# Patient Record
Sex: FEMALE | Race: BLACK OR AFRICAN AMERICAN | Employment: PART TIME | URBAN - METROPOLITAN AREA
[De-identification: names, ages, dates, MRNs, and addresses within clinical notes are randomized per-mention and may not be internally consistent; named-entity substitution may affect disease eponyms.]

---

## 2017-04-25 ENCOUNTER — OFFICE VISIT (OUTPATIENT)
Dept: ONCOLOGY | Age: 62
End: 2017-04-25

## 2017-04-25 ENCOUNTER — HOSPITAL ENCOUNTER (OUTPATIENT)
Dept: ONCOLOGY | Age: 62
Discharge: HOME | End: 2017-04-25

## 2017-04-25 ENCOUNTER — TELEPHONE (OUTPATIENT)
Dept: ONCOLOGY | Age: 62
End: 2017-04-25

## 2017-04-25 VITALS
BODY MASS INDEX: 26.81 KG/M2 | HEIGHT: 61 IN | SYSTOLIC BLOOD PRESSURE: 144 MMHG | WEIGHT: 142 LBS | DIASTOLIC BLOOD PRESSURE: 91 MMHG | HEART RATE: 87 BPM | TEMPERATURE: 98.1 F

## 2017-04-25 DIAGNOSIS — D64.9 ANEMIA, UNSPECIFIED TYPE: ICD-10-CM

## 2017-04-25 DIAGNOSIS — M15.9 PRIMARY OSTEOARTHRITIS INVOLVING MULTIPLE JOINTS: ICD-10-CM

## 2017-04-25 DIAGNOSIS — C50.911 INVASIVE DUCTAL CARCINOMA OF BREAST, RIGHT (HCC): Primary | ICD-10-CM

## 2017-04-25 DIAGNOSIS — K21.9 GASTROESOPHAGEAL REFLUX DISEASE WITHOUT ESOPHAGITIS: ICD-10-CM

## 2017-04-25 LAB
BASO+EOS+MONOS # BLD AUTO: 0.2 K/UL (ref 0–2.3)
BASO+EOS+MONOS # BLD AUTO: 3 % (ref 0.1–17)
DIFFERENTIAL METHOD BLD: ABNORMAL
ERYTHROCYTE [DISTWIDTH] IN BLOOD BY AUTOMATED COUNT: 12.4 % (ref 11.5–14.5)
HCT VFR BLD AUTO: 35.3 % (ref 36–48)
HGB BLD-MCNC: 11 G/DL (ref 12–16)
LYMPHOCYTES # BLD AUTO: 35 % (ref 14–44)
LYMPHOCYTES # BLD: 2.3 K/UL (ref 1.1–5.9)
MCH RBC QN AUTO: 29.3 PG (ref 25–35)
MCHC RBC AUTO-ENTMCNC: 31.2 G/DL (ref 31–37)
MCV RBC AUTO: 93.9 FL (ref 78–102)
NEUTS SEG # BLD: 4.1 K/UL (ref 1.8–9.5)
NEUTS SEG NFR BLD AUTO: 63 % (ref 40–70)
PLATELET # BLD AUTO: 241 K/UL (ref 140–440)
RBC # BLD AUTO: 3.76 M/UL (ref 4.1–5.1)
WBC # BLD AUTO: 6.6 K/UL (ref 4.5–13)

## 2017-04-25 NOTE — PROGRESS NOTES
Hematology/Oncology  Progress Note    Name: Michelle Watters  Date: 2017  : 1955    PCP: Donna Claudio MD     Ms. Roman Hernandez is a 63year old female who was seen for management of her invasive ductal adenocarcinoma, right breast.    Current therapy: Tamoxifen 20 mg daily and oral iron supplementation     Subjective:     Mrs. Roman Hernandez this 68-year-old  woman who has an invasive ductal adenocarcinoma involving the right breast.  She continues to take tamoxifen 20 mg daily. The patient reports that she is continuing to experience mild arthralgias occasionally. Otherwise she is not experiencing any other untoward side effects from the medication. The patient does have a history of GERD and is being control with the use of Nexium. She continues to take her oral iron therapy daily. The patient reports that she is doing her breast self exams on a monthly basis. She is current on her annual mammogram.    Past medical history, family history, and social history: these were reviewed and remains unchanged. Past Medical History:   Diagnosis Date    Anemia NEC     Cancer ([address]     GERD (gastroesophageal reflux disease)     History of Invasive ductal carcinoma of right breast, lymph node positive     Hypertension     Other specified aplastic anemias      Past Surgical History:   Procedure Laterality Date    BREAST SURGERY PROCEDURE UNLISTED      HX BREAST LUMPECTOMY      right    HX  SECTION      HX GYN       Social History     Social History    Marital status:      Spouse name: N/A    Number of children: N/A    Years of education: N/A     Occupational History    Not on file.      Social History Main Topics    Smoking status: Never Smoker    Smokeless tobacco: Not on file    Alcohol use No    Drug use: No    Sexual activity: Yes     Other Topics Concern    Not on file     Social History Narrative    ** Merged History Encounter **          Family History   Problem Relation Age of Onset    Breast Cancer Sister     Breast Cancer Other      aunt-nos    Heart Disease Father     Cancer Father     Hypertension Mother     Asthma Mother      Current Outpatient Prescriptions   Medication Sig Dispense Refill    tamoxifen (NOLVADEX) 20 mg tablet Take 1 Tab by mouth daily. 30 Tab 11    aspirin 81 mg tablet Take 81 mg by mouth.  esomeprazole (NEXIUM) 20 mg capsule Take  by mouth daily.  tamoxifen (NOLVADEX) 20 mg tablet Take 20 mg by mouth two (2) times a day.  ergocalciferol (VITAMIN D2) 50,000 unit capsule Take 50,000 Units by mouth every seven (7) days.  FERROUS FUMARATE (IRON PO) Take  by mouth. Review of Systems  Constitutional: The patient has no acute distress or discomfort. HEENT: The patient denies recent head trauma, eye pain, blurred vision,  hearing deficit, oropharyngeal mucosal pain or lesions, and the patient denies throat pain or discomfort. Lymphatics: The patient denies palpable peripheral lymphadenopathy. Hematologic: The patient denies having bruising, bleeding, or progressive fatigue. Respiratory: Patient denies having shortness of breath, cough, sputum production, fever, or dyspnea on exertion. Cardiovascular: The patient denies having leg pain, leg swelling, heart palpitations, chest permit, chest pain, or lightheadedness. The patient denies having dyspnea on exertion. Gastrointestinal: The patient denies having nausea, emesis, or diarrhea. The patient denies having any hematemesis or blood in the stool. Genitourinary: Patient denies having urinary urgency, frequency, or dysuria. The patient denies having blood in the urine. Psychological: The patient denies having symptoms of nervousness, anxiety, depression, or thoughts of harming himself some of this. Skin: Patient denies having skin rashes, skin, ulcerations, or unexplained itching or pruritus.   Musculoskeletal: The patient denies having pain in the joints or bones. Objective:     Visit Vitals    BP (!) 144/91    Pulse 87    Temp 98.1 °F (36.7 °C)    Ht 5' 1\" (1.549 m)    Wt 64.4 kg (142 lb)    BMI 26.83 kg/m2     ECOG PS=0, pain score=0/10     Physical Exam:   Gen. Appearance: The patient is in no acute distress. Skin: There is no bruise or rash. HEENT: The exam is unremarkable. Neck: Supple without lymphadenopathy or thyromegaly. Lungs: Clear to auscultation and percussion; there are no wheezes or rhonchi. Heart: Regular rate and rhythm; there are no murmurs, gallops, or rubs. Abdomen: Bowel sounds are present and normal.  There is no guarding, tenderness, or hepatosplenomegaly. Extremities: There is no clubbing, cyanosis, or edema. Neurologic: There are no focal neurologic deficits. Lymphatics: There is no palpable peripheral lymphadenopathy. Musculoskeletal: The patient has full range of motion at all joints. There is no evidence of joint deformity or effusions. There is no focal joint tenderness. Psychological/psychiatric: There is no clinical evidence of anxiety, depression, or melancholy. Lab data:      Results for orders placed or performed during the hospital encounter of 04/25/17   CBC WITH 3 PART DIFF     Status: Abnormal   Result Value Ref Range Status    WBC 6.6 4.5 - 13.0 K/uL Final    RBC 3.76 (L) 4.10 - 5.10 M/uL Final    HGB 11.0 (L) 12.0 - 16.0 g/dL Final    HCT 35.3 (L) 36 - 48 % Final    MCV 93.9 78 - 102 FL Final    MCH 29.3 25.0 - 35.0 PG Final    MCHC 31.2 31 - 37 g/dL Final    RDW 12.4 11.5 - 14.5 % Final    PLATELET 272 151 - 037 K/uL Final    NEUTROPHILS 63 40 - 70 % Final    MIXED CELLS 3 0.1 - 17 % Final    LYMPHOCYTES 35 14 - 44 % Final    ABS. NEUTROPHILS 4.1 1.8 - 9.5 K/UL Final    ABS. MIXED CELLS 0.2 0.0 - 2.3 K/uL Final    ABS. LYMPHOCYTES 2.3 1.1 - 5.9 K/UL Final     Comment: Test performed at [address]. Results Reviewed by Medical Director.     DF AUTOMATED   Final Assessment:     1. Invasive ductal carcinoma of breast, right ([address]    2. Anemia, unspecified type    3. Gastroesophageal reflux disease without esophagitis    4. Primary osteoarthritis involving multiple joints         Plan:   Breast cancer, right breast: I have explained to the patient that her most recent  CA 27-29 level was normal.  I will recheck her values at this time. Mammography is current and up to date. The patient was encouraged to continue doing her breast self exams on a monthly basis. Iron deficiency anemia: The CBC today shows that her hemoglobin is 11.0 g/dL with hematocrit of 35.3%. I have recommended that she continue taking the oral iron supplementation twice daily. I will recheck her iron profile and ferritin levels. GERD: The patient was instructed to continue taking the Nexium 20 mg daily. I also recommended that she begin taking a liquid antacid at least one hour after meals and immediately before bedtime. I reminded the patient that she must elevate the head of her bed while sleeping. Arthritis:  Patient was previously told that that clinically she most likely has osteoarthritis. This can be exacerbated by the use of tamoxifen. It was  recommended that she use Tylenol arthritis 2 tablets every 8 hours to control her discomfort. She has being complaint with following the recommendation and she states she is doing much better. I will have the patient return to clinic for a complete assessment again in 4 months.     Orders Placed This Encounter    COMPLETE CBC & AUTO DIFF WBC    InHouse CBC ([facility])     Standing Status:   Future     Number of Occurrences:   1     Standing Expiration Date:   4/4/3543    METABOLIC PANEL, COMPREHENSIVE     Standing Status:   Future     Standing Expiration Date:   4/26/2018    IRON PROFILE     Standing Status:   Future     Standing Expiration Date:   4/26/2018    FERRITIN     Standing Status:   Future     Standing Expiration Date: 4/26/2018    CA 27.29     Standing Status:   Future     Standing Expiration Date:   4/26/2018       Donnie Li MD  4/25/2017

## 2017-04-25 NOTE — PATIENT INSTRUCTIONS
Anemia: Care Instructions  Your Care Instructions    Anemia is a low level of red blood cells, which carry oxygen throughout your body. Many things can cause anemia. Lack of iron is one of the most common causes. Your body needs iron to make hemoglobin, a substance in red blood cells that carries oxygen from the lungs to your body's cells. Without enough iron, the body produces fewer and smaller red blood cells. As a result, your body's cells do not get enough oxygen, and you feel tired and weak. And you may have trouble concentrating. Bleeding is the most common cause of a lack of iron. You may have heavy menstrual bleeding or bleeding caused by conditions such as ulcers, hemorrhoids, or cancer. Regular use of aspirin or other anti-inflammatory medicines (such as ibuprofen) also can cause bleeding in some people. A lack of iron in your diet also can cause anemia, especially at times when the body needs more iron, such as during pregnancy, infancy, and the teen years. Your doctor may have prescribed iron pills. It may take several months of treatment for your iron levels to return to normal. Your doctor also may suggest that you eat foods that are rich in iron, such as meat and beans. There are many other causes of anemia. It is not always due to a lack of iron. Finding the specific cause of your anemia will help your doctor find the right treatment for you. Follow-up care is a key part of your treatment and safety. Be sure to make and go to all appointments, and call your doctor if you are having problems. It's also a good idea to know your test results and keep a list of the medicines you take. How can you care for yourself at home? · Take your medicines exactly as prescribed. Call your doctor if you think you are having a problem with your medicine. · If your doctor recommends iron pills, take them as directed:  ¨ Try to take the pills on an empty stomach about 1 hour before or 2 hours after meals. But you may need to take iron with food to avoid an upset stomach. ¨ Do not take antacids or drink milk or caffeine drinks (such as coffee, tea, or cola) at the same time or within 2 hours of the time that you take your iron. They can make it hard for your body to absorb the iron. ¨ Vitamin C (from food or supplements) helps your body absorb iron. Try taking iron pills with a glass of orange juice or some other food that is high in vitamin C, such as citrus fruits. ¨ Iron pills may cause stomach problems, such as heartburn, nausea, diarrhea, constipation, and cramps. Be sure to drink plenty of fluids, and include fruits, vegetables, and fiber in your diet each day. Iron pills often make your bowel movements dark or green. ¨ If you forget to take an iron pill, do not take a double dose of iron the next time you take a pill. ¨ Keep iron pills out of the reach of small children. An overdose of iron can be very dangerous. · Follow your doctor's advice about eating iron-rich foods. These include red meat, shellfish, poultry, eggs, beans, raisins, whole-grain bread, and leafy green vegetables. · Steam vegetables to help them keep their iron content. When should you call for help? Call 911 anytime you think you may need emergency care. For example, call if:  · You have symptoms of a heart attack. These may include:  ¨ Chest pain or pressure, or a strange feeling in the chest.  ¨ Sweating. ¨ Shortness of breath. ¨ Nausea or vomiting. ¨ Pain, pressure, or a strange feeling in the back, neck, jaw, or upper belly or in one or both shoulders or arms. ¨ Lightheadedness or sudden weakness. ¨ A fast or irregular heartbeat. After you call 911, the  may tell you to chew 1 adult-strength or 2 to 4 low-dose aspirin. Wait for an ambulance. Do not try to drive yourself. · You passed out (lost consciousness).   Call your doctor now or seek immediate medical care if:  · You have new or increased shortness of breath. · You are dizzy or lightheaded, or you feel like you may faint. · Your fatigue and weakness continue or get worse. · You have any abnormal bleeding, such as:  ¨ Nosebleeds. ¨ Vaginal bleeding that is different (heavier, more frequent, at a different time of the month) than what you are used to. ¨ Bloody or black stools, or rectal bleeding. ¨ Bloody or pink urine. Watch closely for changes in your health, and be sure to contact your doctor if:  · You do not get better as expected. Where can you learn more? Go to http://marvin-renetta.info/. Enter R301 in the search box to learn more about \"Anemia: Care Instructions. \"  Current as of: October 13, 2016  Content Version: 11.2  © 6467-0417 [email or web address]. Care instructions adapted under license by [employer] (which disclaims liability or warranty for this information). If you have questions about a medical condition or this instruction, always ask your healthcare professional. [place] [address] any warranty or liability for your use of this information. Breast Cancer: Care Instructions  Your Care Instructions  Breast cancer occurs when abnormal cells grow out of control in the breast. These cancer cells can spread within the breast, to nearby lymph nodes and other tissues, and to other parts of the body. Being treated for cancer can weaken your body, and you may feel very tired. Get the rest your body needs so you can feel better. Finding out that you have cancer is scary. You may feel many emotions and may need some help coping. Seek out family, friends, and counselors for support. You also can do things at home to make yourself feel better while you go through treatment. Call the [employer] [facility] (0-591.785.2787) or visit its website at [address]. org for more information. Follow-up care is a key part of your treatment and safety.  Be sure to make and go to all appointments, and call your doctor if you are having problems. It's also a good idea to know your test results and keep a list of the medicines you take. How can you care for yourself at home? · Take your medicines exactly as prescribed. Call your doctor if you think you are having a problem with your medicine. You may get medicine for nausea and vomiting if you have these side effects. · Follow your doctor's instructions to relieve pain. Pain from cancer and surgery can almost always be controlled. Use pain medicine when you first notice pain, before it becomes severe. · Eat healthy food. If you do not feel like eating, try to eat food that has protein and extra calories to keep up your strength and prevent weight loss. Drink liquid meal replacements for extra calories and protein. Try to eat your main meal early. · Get some physical activity every day, but do not get too tired. Keep doing the hobbies you enjoy as your energy allows. · Do not smoke. Smoking can make your cancer worse. If you need help quitting, talk to your doctor about stop-smoking programs and medicines. These can increase your chances of quitting for good. · Take steps to control your stress and workload. Learn relaxation techniques. ¨ Share your feelings. Stress and tension affect our emotions. By expressing your feelings to others, you may be able to understand and cope with them. ¨ Consider joining a support group. Talking about a problem with your spouse, a good friend, or other people with similar problems is a good way to reduce tension and stress. ¨ Express yourself through art. Try writing, crafts, dance, or art to relieve stress. Some dance, writing, or art groups may be available just for people who have cancer. ¨ Be kind to your body and mind. Getting enough sleep, eating a healthy diet, and taking time to do things you enjoy can contribute to an overall feeling of balance in your life and can help reduce stress. ¨ Get help if you need it. Discuss your concerns with your doctor or counselor. · If you are vomiting or have diarrhea:  ¨ Drink plenty of fluids (enough so that your urine is light yellow or clear like water) to prevent dehydration. Choose water and other caffeine-free clear liquids. If you have kidney, heart, or liver disease and have to limit fluids, talk with your doctor before you increase the amount of fluids you drink. ¨ When you are able to eat, try clear soups, mild foods, and liquids until all symptoms are gone for 12 to 48 hours. Other good choices include dry toast, crackers, cooked cereal, and gelatin dessert, such as Jell-O.  · If you have not already done so, prepare a list of advance directives. Advance directives are instructions to your doctor and family members about what kind of care you want if you become unable to speak or express yourself. When should you call for help? Call your doctor now or seek immediate medical care if:  · You have a fever. · Any part of your breast becomes red, tender, swollen, or hot. · You have pain, redness, or swelling in the arm on the same side as your breast cancer. Watch closely for changes in your health, and be sure to contact your doctor if:  · You have pain that is not controlled by medicine. · You have nausea or vomiting. · You are constipated or have diarrhea. Where can you learn more? Go to http://marvin-renetta.info/. Enter V321 in the search box to learn more about \"Breast Cancer: Care Instructions. \"  Current as of: July 26, 2016  Content Version: 11.2  © 6386-8240 [employer]. Care instructions adapted under license by [employer] (which disclaims liability or warranty for this information). If you have questions about a medical condition or this instruction, always ask your healthcare professional. [place] [address] any warranty or liability for your use of this information.

## 2017-04-25 NOTE — TELEPHONE ENCOUNTER
Patient was seen in [address] [place] [address] office today. She called back later to ask for a referral to see Dr. Saumya Acosta. She said please fax the referral to that doctor's office. Ms. Hilario Ramirez is at [phone] and requested a call back from you.

## 2017-04-25 NOTE — PROGRESS NOTES
Hematology/Oncology  Progress Note    Name: Fiona Dolan  Date: 2017  : 1955    PCP: La Nena Osorio MD     Ms. Miguelito Bagley is a 64year old female who was seen for management of her invasive ductal adenocarcinoma, right breast.    Current therapy: Tamoxifen 20 mg daily and oral iron supplementation     Subjective:     Mrs. Miguelito Bagley this 28-year-old  woman who has a basic ductal adenocarcinoma involving the right breast.  She continues to take tamoxifen 20 mg daily. The patient reports that she is continuing to experience mild arthralgias occasionally. Otherwise she is not experiencing any other untoward side effects from the medication. The patient does have a history of GERD and is being control with the use of Nexium. She admits that she has not been taking her oral iron supplementation daily. The patient reports that she is doing her breast self exams on a monthly basis. She is currently undergoing evaluation by her OB/GYN specialist. Recently in 2015 she had ultrasound of the pelvis and she is following up every 6 months. Past medical history, family history, and social history: these were reviewed and remains unchanged. Past Medical History:   Diagnosis Date    Anemia NEC     Cancer ([address]     GERD (gastroesophageal reflux disease)     History of Invasive ductal carcinoma of right breast, lymph node positive     Hypertension     Other specified aplastic anemias ([address]      Past Surgical History:   Procedure Laterality Date    BREAST SURGERY PROCEDURE UNLISTED      HX BREAST LUMPECTOMY      right    HX  SECTION      HX GYN       Social History     Social History    Marital status:      Spouse name: N/A    Number of children: N/A    Years of education: N/A     Occupational History    Not on file.      Social History Main Topics    Smoking status: Never Smoker    Smokeless tobacco: Not on file    Alcohol use No    Drug use: No    Sexual activity: Yes     Other Topics Concern    Not on file     Social History Narrative    ** Merged History Encounter **          Family History   Problem Relation Age of Onset    Breast Cancer Sister     Breast Cancer Other      aunt-nos    Heart Disease Father     Cancer Father     Hypertension Mother     Asthma Mother      Current Outpatient Prescriptions   Medication Sig Dispense Refill    tamoxifen (NOLVADEX) 20 mg tablet Take 1 Tab by mouth daily. 30 Tab 11    aspirin 81 mg tablet Take 81 mg by mouth.  esomeprazole (NEXIUM) 20 mg capsule Take  by mouth daily.  tamoxifen (NOLVADEX) 20 mg tablet Take 20 mg by mouth two (2) times a day.  ergocalciferol (VITAMIN D2) 50,000 unit capsule Take 50,000 Units by mouth every seven (7) days.  FERROUS FUMARATE (IRON PO) Take  by mouth. Review of Systems  Constitutional: The patient has no acute distress or discomfort. HEENT: The patient denies recent head trauma, eye pain, blurred vision,  hearing deficit, oropharyngeal mucosal pain or lesions, and the patient denies throat pain or discomfort. Lymphatics: The patient denies palpable peripheral lymphadenopathy. Hematologic: The patient denies having bruising, bleeding, or progressive fatigue. Respiratory: Patient denies having shortness of breath, cough, sputum production, fever, or dyspnea on exertion. Cardiovascular: The patient denies having leg pain, leg swelling, heart palpitations, chest permit, chest pain, or lightheadedness. The patient denies having dyspnea on exertion. Gastrointestinal: The patient denies having nausea, emesis, or diarrhea. The patient denies having any hematemesis or blood in the stool. Genitourinary: Patient denies having urinary urgency, frequency, or dysuria. The patient denies having blood in the urine. Psychological: The patient denies having symptoms of nervousness, anxiety, depression, or thoughts of harming himself some of this.   Skin: Patient denies having skin rashes, skin, ulcerations, or unexplained itching or pruritus. Musculoskeletal: The patient denies having pain in the joints or bones. Objective:     Visit Vitals    BP (!) 144/91    Pulse 87    Temp 98.1 °F (36.7 °C)    Ht 5' 1\" (1.549 m)    Wt 64.4 kg (142 lb)    BMI 26.83 kg/m2     ECOG PS=0, pain score=0/10     Physical Exam:   Gen. Appearance: The patient is in no acute distress. Skin: There is no bruise or rash. HEENT: The exam is unremarkable. Neck: Supple without lymphadenopathy or thyromegaly. Lungs: Clear to auscultation and percussion; there are no wheezes or rhonchi. Heart: Regular rate and rhythm; there are no murmurs, gallops, or rubs. Abdomen: Bowel sounds are present and normal.  There is no guarding, tenderness, or hepatosplenomegaly. Extremities: There is no clubbing, cyanosis, or edema. Neurologic: There are no focal neurologic deficits. Lymphatics: There is no palpable peripheral lymphadenopathy. Musculoskeletal: The patient has full range of motion at all joints. There is no evidence of joint deformity or effusions. There is no focal joint tenderness. Psychological/psychiatric: There is no clinical evidence of anxiety, depression, or melancholy. Lab data:      Results for orders placed or performed during the hospital encounter of 04/25/17   CBC WITH 3 PART DIFF     Status: Abnormal   Result Value Ref Range Status    WBC 6.6 4.5 - 13.0 K/uL Final    RBC 3.76 (L) 4.10 - 5.10 M/uL Final    HGB 11.0 (L) 12.0 - 16.0 g/dL Final    HCT 35.3 (L) 36 - 48 % Final    MCV 93.9 78 - 102 FL Final    MCH 29.3 25.0 - 35.0 PG Final    MCHC 31.2 31 - 37 g/dL Final    RDW 12.4 11.5 - 14.5 % Final    PLATELET 331 923 - 728 K/uL Final    NEUTROPHILS 63 40 - 70 % Final    MIXED CELLS 3 0.1 - 17 % Final    LYMPHOCYTES 35 14 - 44 % Final    ABS. NEUTROPHILS 4.1 1.8 - 9.5 K/UL Final    ABS. MIXED CELLS 0.2 0.0 - 2.3 K/uL Final    ABS.  LYMPHOCYTES 2.3 1.1 - 5.9 K/UL Final     Comment: Test performed at [place] [address] Location. Results Reviewed by Medical Director. DF AUTOMATED   Final           Assessment:     1. Invasive ductal carcinoma of breast, right ([address]    2. Anemia, unspecified type    3. Gastroesophageal reflux disease without esophagitis         Plan:   Breast cancer, right breast: I have explained to the patient that on 10/09/ 2015 she had a CA 27-29 level of 11.2 units per mL. I will recheck her values at this time. Mammography is not scheduled again until September. The patient was encouraged to continue doing her breast self exams on a monthly basis. Iron deficiency anemia: The CBC today shows that her hemoglobin is 11.3 g/dL with hematocrit of 35.6%. I have recommended that she continue taking the oral iron supplementation twice daily. I will recheck her iron profile and ferritin levels. GERD: The patient was instructed to continue taking the Nexium 20 mg daily. I also recommended that she begin taking a liquid antacid at least one hour after meals and immediately before bedtime. I reminded the patient that she must elevate the head of her bed while sleeping. Arthritis:  Patient was previously told that that clinically she most likely has osteoarthritis. This can be exacerbated by the use of tamoxifen. It was  recommended that she use Tylenol arthritis 2 tablets every 8 hours to control her discomfort. She has being complaint with following the recommendation and she states she is doing much better. I will have the patient return to clinic for a complete assessment again in 4 months.     Orders Placed This Encounter    COMPLETE CBC & AUTO DIFF WBC    InHouse CBC ([employer])     Standing Status:   Future     Number of Occurrences:   1     Standing Expiration Date:   2/4/9439    METABOLIC PANEL, COMPREHENSIVE     Standing Status:   Future     Standing Expiration Date:   4/26/2018    IRON PROFILE     Standing Status:   Future Standing Expiration Date:   4/26/2018    FERRITIN     Standing Status:   Future     Standing Expiration Date:   4/26/2018    CA 27.29     Standing Status:   Future     Standing Expiration Date:   4/26/2018       Ventura Bunch NP  4/25/2017

## 2017-04-25 NOTE — MR AVS SNAPSHOT
Visit Information Date & Time Provider Department Dept. Phone Encounter #  
 4/25/2017 11:15 AM [facility] Office [phone] [id number] Follow-up Instructions Return in about 4 months (around 8/25/2017). Your Appointments 8/22/2017  4:00 PM  
Office Visit with Isaias Fletcher MD  
[facility] [address] [place] [address] Appt Note: 4 month follow up [facility] [address] [address] [phone]  
  
   
 [facility] [address] Upcoming Health Maintenance Date Due Hepatitis C Screening 1955 Pneumococcal 19-64 Highest Risk (1 of 3 - PCV13) 3/23/1976 DTaP/Tdap/Td series (1 - Tdap) 3/23/1978 FOBT Q 1 YEAR AGE 50-75 3/23/2007 INFLUENZA AGE 9 TO ADULT 8/1/2016 ZOSTER VACCINE AGE 60> 3/23/2017 BREAST CANCER SCRN MAMMOGRAM 2/23/2019 PAP AKA CERVICAL CYTOLOGY 3/16/2019 Allergies as of 4/25/2017  Review Complete On: 4/25/2017 By: Isaias Fletcher MD  
 No Known Allergies Current Immunizations  Never Reviewed No immunizations on file. Not reviewed this visit You Were Diagnosed With   
  
 Codes Comments Invasive ductal carcinoma of breast, right ([facility]    -  Primary ICD-10-CM: C50.911 ICD-9-CM: 174.9 Anemia, unspecified type     ICD-10-CM: D64.9 ICD-9-CM: 285.9 Gastroesophageal reflux disease without esophagitis     ICD-10-CM: K21.9 ICD-9-CM: 530.81 Primary osteoarthritis involving multiple joints     ICD-10-CM: M15.0 ICD-9-CM: 715.09 Vitals BP Pulse Temp Height(growth percentile) Weight(growth percentile) BMI  
 (!) 144/91 87 98.1 °F (36.7 °C) 5' 1\" (1.549 m) 142 lb (64.4 kg) 26.83 kg/m2 Smoking Status Never Smoker BMI and BSA Data Body Mass Index Body Surface Area  
 26.83 kg/m 2 1.66 m 2 Preferred Pharmacy Pharmacy Name Phone [id number] - [place] [facility] [address] [phone] Your Updated Medication List  
  
   
This list is accurate as of: 4/25/17 12:32 PM.  Always use your most recent med list.  
  
  
  
  
 aspirin 81 mg tablet Take 81 mg by mouth. IRON PO Take  by mouth. NexIUM 20 mg capsule Generic drug:  esomeprazole Take  by mouth daily. * tamoxifen 20 mg tablet Commonly known as:  NOLVADEX Take 20 mg by mouth two (2) times a day. * tamoxifen 20 mg tablet Commonly known as:  NOLVADEX Take 1 Tab by mouth daily. VITAMIN D2 50,000 unit capsule Generic drug:  ergocalciferol Take 50,000 Units by mouth every seven (7) days. * Notice: This list has 2 medication(s) that are the same as other medications prescribed for you. Read the directions carefully, and ask your doctor or other care provider to review them with you. We Performed the Following COMPLETE CBC & AUTO DIFF WBC [25186 CPT(R)] Follow-up Instructions Return in about 4 months (around 8/25/2017). To-Do List   
 04/25/2017 Lab:  CBC WITH 3 PART DIFF   
  
 04/26/2017 Lab:  CA 27.29   
  
 04/26/2017 Lab:  FERRITIN   
  
 04/26/2017 Lab:  IRON PROFILE   
  
 04/26/2017 Lab:  METABOLIC PANEL, COMPREHENSIVE Patient Instructions Anemia: Care Instructions Your Care Instructions Anemia is a low level of red blood cells, which carry oxygen throughout your body. Many things can cause anemia. Lack of iron is one of the most common causes. Your body needs iron to make hemoglobin, a substance in red blood cells that carries oxygen from the lungs to your body's cells. Without enough iron, the body produces fewer and smaller red blood cells. As a result, your body's cells do not get enough oxygen, and you feel tired and weak. And you may have trouble concentrating. Bleeding is the most common cause of a lack of iron. You may have heavy menstrual bleeding or bleeding caused by conditions such as ulcers, hemorrhoids, or cancer. Regular use of aspirin or other anti-inflammatory medicines (such as ibuprofen) also can cause bleeding in some people. A lack of iron in your diet also can cause anemia, especially at times when the body needs more iron, such as during pregnancy, infancy, and the teen years. Your doctor may have prescribed iron pills. It may take several months of treatment for your iron levels to return to normal. Your doctor also may suggest that you eat foods that are rich in iron, such as meat and beans. There are many other causes of anemia. It is not always due to a lack of iron. Finding the specific cause of your anemia will help your doctor find the right treatment for you. Follow-up care is a key part of your treatment and safety. Be sure to make and go to all appointments, and call your doctor if you are having problems. It's also a good idea to know your test results and keep a list of the medicines you take. How can you care for yourself at home? · Take your medicines exactly as prescribed. Call your doctor if you think you are having a problem with your medicine. · If your doctor recommends iron pills, take them as directed: ¨ Try to take the pills on an empty stomach about 1 hour before or 2 hours after meals. But you may need to take iron with food to avoid an upset stomach. ¨ Do not take antacids or drink milk or caffeine drinks (such as coffee, tea, or cola) at the same time or within 2 hours of the time that you take your iron. They can make it hard for your body to absorb the iron. ¨ Vitamin C (from food or supplements) helps your body absorb iron. Try taking iron pills with a glass of orange juice or some other food that is high in vitamin C, such as citrus fruits.  
¨ Iron pills may cause stomach problems, such as heartburn, nausea, diarrhea, constipation, and cramps. Be sure to drink plenty of fluids, and include fruits, vegetables, and fiber in your diet each day. Iron pills often make your bowel movements dark or green. ¨ If you forget to take an iron pill, do not take a double dose of iron the next time you take a pill. ¨ Keep iron pills out of the reach of small children. An overdose of iron can be very dangerous. · Follow your doctor's advice about eating iron-rich foods. These include red meat, shellfish, poultry, eggs, beans, raisins, whole-grain bread, and leafy green vegetables. · Steam vegetables to help them keep their iron content. When should you call for help? Call 911 anytime you think you may need emergency care. For example, call if: 
· You have symptoms of a heart attack. These may include: ¨ Chest pain or pressure, or a strange feeling in the chest. 
¨ Sweating. ¨ Shortness of breath. ¨ Nausea or vomiting. ¨ Pain, pressure, or a strange feeling in the back, neck, jaw, or upper belly or in one or both shoulders or arms. ¨ Lightheadedness or sudden weakness. ¨ A fast or irregular heartbeat. After you call 911, the  may tell you to chew 1 adult-strength or 2 to 4 low-dose aspirin. Wait for an ambulance. Do not try to drive yourself. · You passed out (lost consciousness). Call your doctor now or seek immediate medical care if: 
· You have new or increased shortness of breath. · You are dizzy or lightheaded, or you feel like you may faint. · Your fatigue and weakness continue or get worse. · You have any abnormal bleeding, such as: 
¨ Nosebleeds. ¨ Vaginal bleeding that is different (heavier, more frequent, at a different time of the month) than what you are used to. ¨ Bloody or black stools, or rectal bleeding. ¨ Bloody or pink urine. Watch closely for changes in your health, and be sure to contact your doctor if: 
· You do not get better as expected. Where can you learn more? Go to http://marvin-renetta.info/. Enter R301 in the search box to learn more about \"Anemia: Care Instructions. \" Current as of: October 13, 2016 Content Version: 11.2 © 5873-7378 [employer]. Care instructions adapted under license by [employer] (which disclaims liability or warranty for this information). If you have questions about a medical condition or this instruction, always ask your healthcare professional. [address] any warranty or liability for your use of this information. Introducing [facility]! Shelia Jiang introduces [employer] patient portal. Now you can access parts of your medical record, email your doctor's office, and request medication refills online. 1. In your internet browser, go to [email or web address] 2. Click on the First Time User? Click Here link in the Sign In box. You will see the New Member Sign Up page. 3. Enter your [employer] Access Code exactly as it appears below. You will not need to use this code after youve completed the sign-up process. If you do not sign up before the expiration date, you must request a new code. · [employer] Access Code: [place] Expires: 7/24/2017 12:01 PM 
 
4. Enter the last four digits of your Social Security Number (xxxx) and Date of Birth (mm/dd/yyyy) as indicated and click Submit. You will be taken to the next sign-up page. 5. Create a [employer] ID. This will be your [employer] login ID and cannot be changed, so think of one that is secure and easy to remember. 6. Create a [employer] password. You can change your password at any time. 7. Enter your Password Reset Question and Answer. This can be used at a later time if you forget your password. 8. Enter your e-mail address. You will receive e-mail notification when new information is available in [address] 9. Click Sign Up. You can now view and download portions of your medical record. 10. Click the Download Summary menu link to download a portable copy of your medical information. If you have questions, please visit the Frequently Asked Questions section of the [employer] website. Remember, [employer] is NOT to be used for urgent needs. For medical emergencies, dial 911. Now available from your iPhone and Android! Please provide this summary of care documentation to your next provider. Your primary care clinician is listed as Romel Jarrett. If you have any questions after today's visit, please call [phone].

## 2017-04-26 LAB
ALBUMIN SERPL-MCNC: 4.2 G/DL (ref 3.6–4.8)
ALBUMIN/GLOB SERPL: 1.8 {RATIO} (ref 1.2–2.2)
ALP SERPL-CCNC: 85 IU/L (ref 39–117)
ALT SERPL-CCNC: 16 IU/L (ref 0–32)
AST SERPL-CCNC: 11 IU/L (ref 0–40)
BILIRUB SERPL-MCNC: 0.3 MG/DL (ref 0–1.2)
BUN SERPL-MCNC: 13 MG/DL (ref 8–27)
BUN/CREAT SERPL: 17 (ref 12–28)
CALCIUM SERPL-MCNC: 9.2 MG/DL (ref 8.7–10.3)
CANCER AG27-29 SERPL-ACNC: 14.7 U/ML (ref 0–38.6)
CHLORIDE SERPL-SCNC: 103 MMOL/L (ref 96–106)
CO2 SERPL-SCNC: 24 MMOL/L (ref 18–29)
CREAT SERPL-MCNC: 0.77 MG/DL (ref 0.57–1)
FERRITIN SERPL-MCNC: 111 NG/ML (ref 15–150)
GLOBULIN SER CALC-MCNC: 2.4 G/DL (ref 1.5–4.5)
GLUCOSE SERPL-MCNC: 93 MG/DL (ref 65–99)
IRON SATN MFR SERPL: 27 % (ref 15–55)
IRON SERPL-MCNC: 80 UG/DL (ref 27–159)
POTASSIUM SERPL-SCNC: 3.7 MMOL/L (ref 3.5–5.2)
PROT SERPL-MCNC: 6.6 G/DL (ref 6–8.5)
SODIUM SERPL-SCNC: 144 MMOL/L (ref 134–144)
SPECIMEN STATUS REPORT, ROLRST: NORMAL
TIBC SERPL-MCNC: 294 UG/DL (ref 250–450)
UIBC SERPL-MCNC: 214 UG/DL (ref 131–425)

## 2020-02-11 ENCOUNTER — OFFICE VISIT (OUTPATIENT)
Dept: ONCOLOGY | Age: 65
End: 2020-02-11

## 2020-02-11 VITALS
WEIGHT: 128 LBS | HEIGHT: 61 IN | DIASTOLIC BLOOD PRESSURE: 72 MMHG | BODY MASS INDEX: 24.17 KG/M2 | OXYGEN SATURATION: 99 % | TEMPERATURE: 98.5 F | RESPIRATION RATE: 16 BRPM | HEART RATE: 85 BPM | SYSTOLIC BLOOD PRESSURE: 114 MMHG

## 2020-02-11 DIAGNOSIS — K21.9 GASTROESOPHAGEAL REFLUX DISEASE WITHOUT ESOPHAGITIS: ICD-10-CM

## 2020-02-11 DIAGNOSIS — D50.8 IRON DEFICIENCY ANEMIA SECONDARY TO INADEQUATE DIETARY IRON INTAKE: ICD-10-CM

## 2020-02-11 DIAGNOSIS — C50.911 INFILTRATING DUCTAL CARCINOMA OF RIGHT BREAST (HCC): Primary | ICD-10-CM

## 2020-02-11 DIAGNOSIS — M15.9 PRIMARY OSTEOARTHRITIS INVOLVING MULTIPLE JOINTS: ICD-10-CM

## 2020-02-11 NOTE — PATIENT INSTRUCTIONS
Complete Blood Count (CBC): About This Test  What is it? A complete blood count (CBC) is a blood test that gives important information about your blood cells, especially red blood cells, white blood cells, and platelets. Why is this test done? A CBC may be done as part of a regular physical exam. There are many other reasons that a doctor may want this blood test, including to:  · Find the cause of symptoms such as fatigue, weakness, fever, bruising, or weight loss. · Find anemia or an infection. · See how much blood has been lost if there is bleeding. · Diagnose diseases of the blood, such as leukemia or polycythemia. How can you prepare for the test?  You do not need to do anything before having this test.  What happens during the test?  The health professional taking a sample of your blood will:  · Wrap an elastic band around your upper arm. This makes the veins below the band larger so it is easier to put a needle into the vein. · Clean the needle site with alcohol. · Put the needle into the vein. · Attach a tube to the needle to fill it with blood. · Remove the band from your arm when enough blood is collected. · Put a gauze pad or cotton ball over the needle site as the needle is removed. · Put pressure on the site and then put on a bandage. If this blood test is done on a baby, a heel stick may be done instead of a blood draw from a vein. What happens after the test?  · You will probably be able to go home right away. · You can go back to your usual activities right away. Follow-up care is a key part of your treatment and safety. Be sure to make and go to all appointments, and call your doctor if you are having problems. It's also a good idea to keep a list of the medicines you take. Ask your doctor when you can expect to have your test results. Where can you learn more? Go to http://marvin-renetta.info/.   Enter N139 in the search box to learn more about \"Complete Blood Count (CBC): About This Test.\"  Current as of: March 28, 2019  Content Version: 12.2  © 0488-1972 [employer]. Care instructions adapted under license by [employer] (which disclaims liability or warranty for this information). If you have questions about a medical condition or this instruction, always ask your healthcare professional. [address] any warranty or liability for your use of this information.

## 2020-02-11 NOTE — PROGRESS NOTES
Hematology/Oncology  Progress Note    Name: Skip Nicholas  Date: 2020  : 1955    PCP: Leilani Harmon MD     Ms. Raul Morton is a 63year old female who was seen for management of her invasive ductal adenocarcinoma, right breast.    Current therapy: Tamoxifen 20mg PO daily and oral iron supplementation     Subjective:     Mrs. Raul Morton this 63-year-old  woman who has an invasive ductal adenocarcinoma involving the right breast. Patient admits she lost to follow up because of her insurance. She was last seen in 2017. Now she states she has Medicaid and will be able to make it to all her appointments. She is requesting for a new prescription for her Tamoxifen. She reports she has been doing well. She was able to get her mammogram done on 2020 and it was normal. She denies fatigue, shortness of breath, and weakness. She denies chest pain or dizziness. She denies pain or any discomfort. She does not have any concerns or complaints to report at this time. Past medical history, family history, and social history: these were reviewed and remains unchanged.     Past Medical History:   Diagnosis Date    Anemia NEC     Cancer ([facility] [address]     GERD (gastroesophageal reflux disease)     History of Invasive ductal carcinoma of right breast, lymph node positive     Hypertension     Other specified aplastic anemias      Past Surgical History:   Procedure Laterality Date    BREAST SURGERY PROCEDURE UNLISTED      HX BREAST LUMPECTOMY      right    HX  SECTION      HX GYN       Social History     Socioeconomic History    Marital status:      Spouse name: Not on file    Number of children: Not on file    Years of education: Not on file    Highest education level: Not on file   Occupational History    Not on file   Social Needs    Financial resource strain: Not on file    Food insecurity:     Worry: Not on file     Inability: Not on file    Transportation needs: Medical: Not on file     Non-medical: Not on file   Tobacco Use    Smoking status: Never Smoker   Substance and Sexual Activity    Alcohol use: No    Drug use: No    Sexual activity: Yes   Lifestyle    Physical activity:     Days per week: Not on file     Minutes per session: Not on file    Stress: Not on file   Relationships    Social connections:     Talks on phone: Not on file     Gets together: Not on file     Attends Latter-day service: Not on file     Active member of club or organization: Not on file     Attends meetings of clubs or organizations: Not on file     Relationship status: Not on file    Intimate partner violence:     Fear of current or ex partner: Not on file     Emotionally abused: Not on file     Physically abused: Not on file     Forced sexual activity: Not on file   Other Topics Concern    Not on file   Social History Narrative    ** Merged History Encounter **          Family History   Problem Relation Age of Onset    Breast Cancer Sister     Breast Cancer Other         aunt-nos    Heart Disease Father     Cancer Father     Hypertension Mother     Asthma Mother      Current Outpatient Medications   Medication Sig Dispense Refill    tamoxifen (NOLVADEX) 20 mg tablet Take 1 Tab by mouth daily. 30 Tab 11    aspirin 81 mg tablet Take 81 mg by mouth.  esomeprazole (NEXIUM) 20 mg capsule Take  by mouth daily.  tamoxifen (NOLVADEX) 20 mg tablet Take 20 mg by mouth two (2) times a day.  ergocalciferol (VITAMIN D2) 50,000 unit capsule Take 50,000 Units by mouth every seven (7) days.  FERROUS FUMARATE (IRON PO) Take  by mouth. Review of Systems  Constitutional: The patient has no acute distress or discomfort. HEENT: The patient denies recent head trauma, eye pain, blurred vision,  hearing deficit, oropharyngeal mucosal pain or lesions, and the patient denies throat pain or discomfort. Lymphatics:  The patient denies palpable peripheral lymphadenopathy. Hematologic: The patient denies having bruising, bleeding, or progressive fatigue. Respiratory: Patient denies having shortness of breath, cough, sputum production, fever, or dyspnea on exertion. Cardiovascular: The patient denies having leg pain, leg swelling, heart palpitations, chest permit, chest pain, or lightheadedness. The patient denies having dyspnea on exertion. Gastrointestinal: The patient denies having nausea, emesis, or diarrhea. The patient denies having any hematemesis or blood in the stool. Genitourinary: Patient denies having urinary urgency, frequency, or dysuria. The patient denies having blood in the urine. Psychological: The patient denies having symptoms of nervousness, anxiety, depression, or thoughts of harming himself some of this. Skin: Patient denies having skin rashes, skin, ulcerations, or unexplained itching or pruritus. Musculoskeletal: The patient denies having pain in the joints or bones. Objective:     Visit Vitals  /72   Pulse 85   Temp 98.5 °F (36.9 °C) (Oral)   Resp 16   Ht 5' 1\" (1.549 m)   Wt 58.1 kg (128 lb)   SpO2 99%   BMI 24.19 kg/m²     ECOG PS=0, pain score=0/10     Physical Exam:   Gen. Appearance: The patient is in no acute distress. Skin: There is no bruise or rash. HEENT: The exam is unremarkable. Neck: Supple without lymphadenopathy or thyromegaly. Lungs: Clear to auscultation and percussion; there are no wheezes or rhonchi. Heart: Regular rate and rhythm; there are no murmurs, gallops, or rubs. Abdomen: Bowel sounds are present and normal.  There is no guarding, tenderness, or hepatosplenomegaly. Extremities: There is no clubbing, cyanosis, or edema. Neurologic: There are no focal neurologic deficits. Lymphatics: There is no palpable peripheral lymphadenopathy. Musculoskeletal: The patient has full range of motion at all joints. There is no evidence of joint deformity or effusions.   There is no focal joint tenderness. Psychological/psychiatric: There is no clinical evidence of anxiety, depression, or melancholy. Lab data:      Results for orders placed or performed during the hospital encounter of 04/25/17   CBC WITH 3 PART DIFF     Status: Abnormal   Result Value Ref Range Status    WBC 6.6 4.5 - 13.0 K/uL Final    RBC 3.76 (L) 4.10 - 5.10 M/uL Final    HGB 11.0 (L) 12.0 - 16.0 g/dL Final    HCT 35.3 (L) 36 - 48 % Final    MCV 93.9 78 - 102 FL Final    MCH 29.3 25.0 - 35.0 PG Final    MCHC 31.2 31 - 37 g/dL Final    RDW 12.4 11.5 - 14.5 % Final    PLATELET 688 309 - 458 K/uL Final    NEUTROPHILS 63 40 - 70 % Final    MIXED CELLS 3 0.1 - 17 % Final    LYMPHOCYTES 35 14 - 44 % Final    ABS. NEUTROPHILS 4.1 1.8 - 9.5 K/UL Final    ABS. MIXED CELLS 0.2 0.0 - 2.3 K/uL Final    ABS. LYMPHOCYTES 2.3 1.1 - 5.9 K/UL Final     Comment: Test performed at [place] [address] Location. Results Reviewed by Medical Director. DF AUTOMATED   Final           Assessment:     1. Infiltrating ductal carcinoma of right breast ([address]    2. Iron deficiency anemia secondary to inadequate dietary iron intake    3. Gastroesophageal reflux disease without esophagitis    4. Primary osteoarthritis involving multiple joints         Plan:   Breast cancer, right breast: I have explained to the patient that her most recent  CA 27-29 level was normal.  I will recheck her values at this time. On 1/23/2020 her mammogram showed no mammographic evidence of malignancy and annual screening mammography is recommended. Iron deficiency anemia: Her most recent CBC from 02/19/2019 showed that her hemoglobin was 10.8g/dL with hematocrit of 35.3%. She is currently taking iron supplementation daily. This will be continued. Iron Profile and Ferritin level will be obtained at this time. GERD: The patient was instructed to continue taking Nexium 20mg PO daily.  I reminded the patient that she must elevate the head of her bed while sleeping. Arthritis: Patient was previously told that she most likely has osteoarthritis. This can be exacerbated by the use of tamoxifen. It was  recommended that she use Tylenol arthritis 2 tablets every 8 hours to control her discomfort. She has being complaint with following the recommendation and she states she is doing much better. I will have the patient return to clinic for a complete assessment again in 3 months or sooner if indicated. Orders Placed This Encounter    CBC WITH AUTOMATED DIFF     Standing Status:   Future     Standing Expiration Date:   2/11/2021    IRON PROFILE     Standing Status:   Future     Standing Expiration Date:   4/72/1715    METABOLIC PANEL, COMPREHENSIVE     Standing Status:   Future     Standing Expiration Date:   2/11/2021    FERRITIN     Standing Status:   Future     Standing Expiration Date:   2/11/2021    CA 27.29     Standing Status:   Future     Standing Expiration Date:   2/11/2021         Elif Taylor NP  2/11/2020    I have assessed the patient independently and  agree with the full assessment as outlined.   Ryan Thomas MD, [address]

## 2020-02-12 LAB
A-G RATIO,AGRAT: 1.8 RATIO (ref 1.1–2.6)
ABSOLUTE LYMPHOCYTE COUNT, 10803: 1.4 K/UL (ref 1–4.8)
ALBUMIN SERPL-MCNC: 4.2 G/DL (ref 3.5–5)
ALP SERPL-CCNC: 85 U/L (ref 40–120)
ALT SERPL-CCNC: 11 U/L (ref 5–40)
ANION GAP SERPL CALC-SCNC: 12 MMOL/L
AST SERPL W P-5'-P-CCNC: 12 U/L (ref 10–37)
BASOPHILS # BLD: 0 K/UL (ref 0–0.2)
BASOPHILS NFR BLD: 0 % (ref 0–2)
BILIRUB SERPL-MCNC: 0.4 MG/DL (ref 0.2–1.2)
BUN SERPL-MCNC: 16 MG/DL (ref 6–22)
CALCIUM SERPL-MCNC: 9.2 MG/DL (ref 8.4–10.5)
CHLORIDE SERPL-SCNC: 106 MMOL/L (ref 98–110)
CO2 SERPL-SCNC: 26 MMOL/L (ref 20–32)
CREAT SERPL-MCNC: 0.8 MG/DL (ref 0.8–1.4)
EOSINOPHIL # BLD: 0.1 K/UL (ref 0–0.5)
EOSINOPHIL NFR BLD: 2 % (ref 0–6)
ERYTHROCYTE [DISTWIDTH] IN BLOOD BY AUTOMATED COUNT: 12.4 % (ref 10–15.5)
FE % SATURATION,PSAT: 36 % (ref 20–50)
FERRITIN SERPL-MCNC: 143 NG/ML (ref 10–291)
GFRAA, 66117: >60
GFRNA, 66118: >60
GLOBULIN,GLOB: 2.3 G/DL (ref 2–4)
GLUCOSE SERPL-MCNC: 94 MG/DL (ref 70–99)
GRANULOCYTES,GRANS: 64 % (ref 40–75)
HCT VFR BLD AUTO: 34.5 % (ref 35.1–48)
HGB BLD-MCNC: 10.2 G/DL (ref 11.7–16)
IRON,IRN: 92 MCG/DL (ref 30–160)
LYMPHOCYTES, LYMLT: 27 % (ref 20–45)
MCH RBC QN AUTO: 30 PG (ref 26–34)
MCHC RBC AUTO-ENTMCNC: 30 G/DL (ref 31–36)
MCV RBC AUTO: 102 FL (ref 81–99)
MONOCYTES # BLD: 0.4 K/UL (ref 0.1–1)
MONOCYTES NFR BLD: 7 % (ref 3–12)
NEUTROPHILS # BLD AUTO: 3.3 K/UL (ref 1.8–7.7)
PLATELET # BLD AUTO: 234 K/UL (ref 140–440)
PMV BLD AUTO: 10.5 FL (ref 9–13)
POTASSIUM SERPL-SCNC: 3.5 MMOL/L (ref 3.5–5.5)
PROT SERPL-MCNC: 6.5 G/DL (ref 6.2–8.1)
RBC # BLD AUTO: 3.4 M/UL (ref 3.8–5.2)
SODIUM SERPL-SCNC: 144 MMOL/L (ref 133–145)
TIBC,TIBC: 256 MCG/DL (ref 228–428)
UIBC SERPL-MCNC: 164 MCG/DL (ref 110–370)
WBC # BLD AUTO: 5.1 K/UL (ref 4–11)

## 2020-02-13 LAB — CA 27.29, 142134: 12.8 U/ML (ref 0–38.6)

## 2020-05-12 ENCOUNTER — VIRTUAL VISIT (OUTPATIENT)
Dept: ONCOLOGY | Age: 65
End: 2020-05-12

## 2020-05-12 VITALS — HEIGHT: 61 IN | BODY MASS INDEX: 23.6 KG/M2 | WEIGHT: 125 LBS

## 2020-05-12 DIAGNOSIS — D50.8 IRON DEFICIENCY ANEMIA SECONDARY TO INADEQUATE DIETARY IRON INTAKE: ICD-10-CM

## 2020-05-12 DIAGNOSIS — C50.911 INFILTRATING DUCTAL CARCINOMA OF RIGHT BREAST (HCC): Primary | ICD-10-CM

## 2020-05-12 RX ORDER — TAMOXIFEN CITRATE 20 MG/1
20 TABLET ORAL DAILY
Qty: 30 TAB | Refills: 11 | Status: SHIPPED | OUTPATIENT
Start: 2020-05-12

## 2020-05-12 NOTE — PROGRESS NOTES
Kalpesh Coker is a 63 y.o. female evaluated via audio only technology on 5/12/2020. Consent: She and/or her health care decision maker is aware that she may receive a bill for this audio only encounter, depending on her insurance coverage, and has provided verbal consent to proceed: Yes    Attending Physician: Dr. Kathy Mahmood:   1. Infiltrating ductal carcinoma of right breast ([address]  *CA27-29 on 02/11/2020 was normal at 12.8U/mL. *Mammogram from 01/23/2020 was also normal  *Continue Tamoxifen 20mg PO daily. Rx was sent to her pharmacy. *Will continue to follow up  *Labs prior to next visit:  - CA 27.29; Future  - METABOLIC PANEL, COMPREHENSIVE; Future    2. Iron deficiency anemia secondary to inadequate dietary iron intake  *02/11/2020 her CBC showed a WBC count of 5.1K/uL, hemoglobin was 11.0g/dL, hematocrit 37.4%, and the platelet count was 371,555. *Her iron profile was normal with an iron level of 92mcg/dL and an iron saturation of 36%. *The ferritin was 143ng/mL. *Kidney function was normal with a BUN of 16mg/dL and a creatinine of 0.8mg/dL. *Continue Slow Fe 1 tab PO daily  *Rx was sent to her pharmacy  *Labs prior to next appointment:  - Sanjiv Herbert; Future  - IRON PROFILE; Future  12  Subjective:   Kalpesh Coker is a 63 y.o. female who was evaluated via audio only technology. I communicated with the patient and/or health care decision maker about the ongoing management of her Invasive Ductal Carcinoma Right breast and Iron Deficiency Anemia. She states she is currently taking Tamoxifen 20mg PO daily. She states she is tolerating this well. I informed the patient that at her last clinic visit on 02/11/2020 her CBC showed a WBC count of 5.1K/uL, hemoglobin was 11.0g/dL, hematocrit 37.4%, and the platelet count was 861,163. Her iron profile was normal with an iron level of 92mcg/dL and an iron saturation of 36%. The ferritin was 143ng/mL.  Her kidney function was normal with a BUN of 16mg/dL and a creatinine of 0.8mg/dL. Her CA27-29 was also normal at 12.8U/mL. Her mammogram which was completed on 01/23/2020 was also normal. Patient will fax the result to our [place] office. Her follow up mammogram is scheduled on January 2021. The patient is also taking iron supplementation in the form of Slow Fe 1 tab PO daily. She was advised to continue with the iron supplementation. Tamoxifen will also be continued. Overall the patient reports that she is doing well. She denies shortness of breath, weakness, and fatigue. She denies fevers, infections, and weight loss. She denies pain or any discomfort. She has no new complaints or concerns to report. We will schedule her lab tests 1 week prior to her next follow up appointment. The patient had her questions answered to her satisfaction. Follow up in 3 months or sooner if indicated. Prior to Admission medications    Medication Sig Start Date End Date Taking? Authorizing Provider   tamoxifen (NOLVADEX) 20 mg tablet Take 1 Tab by mouth daily. 5/12/20  Yes Apuli, Leda Goldberg, NP   ferrous sulfate (SLOW FE) 142 mg (45 mg iron) ER tablet Take 1 Tab by mouth Daily (before breakfast). 5/12/20  Yes Apuli, Leda Goldberg, NP   tamoxifen (NOLVADEX) 20 mg tablet Take 1 Tab by mouth daily. 4/25/17 5/12/20  Eloisa Ortiz NP   aspirin 81 mg tablet Take 81 mg by mouth. Provider, Historical   esomeprazole (NEXIUM) 20 mg capsule Take  by mouth daily. Provider, Historical   tamoxifen (NOLVADEX) 20 mg tablet Take 20 mg by mouth two (2) times a day. Provider, Historical   ergocalciferol (VITAMIN D2) 50,000 unit capsule Take 50,000 Units by mouth every seven (7) days. Provider, Historical   FERROUS FUMARATE (IRON PO) Take  by mouth.       Provider, Historical     Lab Results   Component Value Date/Time    WBC 5.1 02/11/2020 10:25 AM    HGB (POC) 11.0 (A) 02/21/2014 10:08 AM    HGB 10.2 (L) 02/11/2020 10:25 AM    HCT (POC) 37.4 02/21/2014 10:08 AM    HCT 34.5 (L) 02/11/2020 10:25 AM    PLATELET 072 67/19/2207 10:25 AM     (H) 02/11/2020 10:25 AM     Lab Results   Component Value Date/Time    Sodium 144 02/11/2020 10:25 AM    Potassium 3.5 02/11/2020 10:25 AM    Chloride 106 02/11/2020 10:25 AM    CO2 26 02/11/2020 10:25 AM    Anion gap 12.0 02/11/2020 10:25 AM    Glucose 94 02/11/2020 10:25 AM    BUN 16 02/11/2020 10:25 AM    Creatinine 0.8 02/11/2020 10:25 AM    BUN/Creatinine ratio 17 04/25/2017 11:18 AM    GFR est AA 97 04/25/2017 11:18 AM    GFR est non-AA 84 04/25/2017 11:18 AM    Calcium 9.2 02/11/2020 10:25 AM    Bilirubin, total 0.4 02/11/2020 10:25 AM    AST (SGOT) 12 02/11/2020 10:25 AM    Alk. phosphatase 85 02/11/2020 10:25 AM    Protein, total 6.5 02/11/2020 10:25 AM    Albumin 4.2 02/11/2020 10:25 AM    Globulin 2.3 02/11/2020 10:25 AM    A-G Ratio 1.8 02/11/2020 10:25 AM    ALT (SGPT) 11 02/11/2020 10:25 AM     Lab Results   Component Value Date/Time    Iron 92 02/11/2020 10:25 AM    TIBC 256 02/11/2020 10:25 AM    Iron % saturation 36 02/11/2020 10:25 AM    Ferritin 143 02/11/2020 10:25 AM       No Known Allergies      I affirm this is a Patient-Initiated Episode with a Patient who has not had a related appointment within my department in the past 7 days or scheduled within the next 24 hours.     Total Time: minutes: 21-30 minutes  Lab scheduled: 1 week prior to next appointment  Follow up with Dr. Gorge Rowley in 3 months or sooner if indicated    Orders Placed This Encounter    CA 27.29     Standing Status:   Future     Standing Expiration Date:   5/13/2021    FERRITIN     Standing Status:   Future     Standing Expiration Date:   5/13/2021    IRON PROFILE     Standing Status:   Future     Standing Expiration Date:   2/23/7911    METABOLIC PANEL, COMPREHENSIVE     Standing Status:   Future     Standing Expiration Date:   5/13/2021    CBC WITH 3 PART DIFF     Standing Status:   Future     Standing Expiration Date:   11/12/2020   Aylin Ramos tamoxifen (NOLVADEX) 20 mg tablet     Sig: Take 1 Tab by mouth daily. Dispense:  30 Tab     Refill:  11    ferrous sulfate (SLOW FE) 142 mg (45 mg iron) ER tablet     Sig: Take 1 Tab by mouth Daily (before breakfast).      Dispense:  60 Tab     Refill:  [address], [facility]  05/12/2020

## 2020-05-20 NOTE — TELEPHONE ENCOUNTER
The patient called to say the pharmacy can not \"find\" the Slow Fe Rx that you sent and that they acknowledged receiving on May 12 at 11:07AM. Could you please resend it?

## 2020-08-03 ENCOUNTER — HOSPITAL ENCOUNTER (OUTPATIENT)
Dept: INFUSION THERAPY | Age: 65
Discharge: HOME | End: 2020-08-03
Payer: COMMERCIAL

## 2020-08-03 DIAGNOSIS — D50.8 IRON DEFICIENCY ANEMIA SECONDARY TO INADEQUATE DIETARY IRON INTAKE: ICD-10-CM

## 2020-08-03 DIAGNOSIS — C50.911 INFILTRATING DUCTAL CARCINOMA OF RIGHT BREAST (HCC): ICD-10-CM

## 2020-08-03 LAB
ALBUMIN SERPL-MCNC: 3.8 G/DL (ref 3.4–5)
ALBUMIN/GLOB SERPL: 0.9 {RATIO} (ref 0.8–1.7)
ALP SERPL-CCNC: 101 U/L (ref 45–117)
ALT SERPL-CCNC: 34 U/L (ref 13–56)
ANION GAP SERPL CALC-SCNC: 4 MMOL/L (ref 3–18)
AST SERPL-CCNC: 18 U/L (ref 10–38)
BASO+EOS+MONOS # BLD AUTO: 0.2 K/UL (ref 0–2.3)
BASO+EOS+MONOS NFR BLD AUTO: 3 % (ref 0.1–17)
BILIRUB SERPL-MCNC: 0.5 MG/DL (ref 0.2–1)
BUN SERPL-MCNC: 26 MG/DL (ref 7–18)
BUN/CREAT SERPL: 26 (ref 12–20)
CALCIUM SERPL-MCNC: 9.4 MG/DL (ref 8.5–10.1)
CHLORIDE SERPL-SCNC: 104 MMOL/L (ref 100–111)
CO2 SERPL-SCNC: 32 MMOL/L (ref 21–32)
CREAT SERPL-MCNC: 0.99 MG/DL (ref 0.6–1.3)
DIFFERENTIAL METHOD BLD: ABNORMAL
ERYTHROCYTE [DISTWIDTH] IN BLOOD BY AUTOMATED COUNT: 12.2 % (ref 11.5–14.5)
FERRITIN SERPL-MCNC: 236 NG/ML (ref 8–388)
GLOBULIN SER CALC-MCNC: 4.3 G/DL (ref 2–4)
GLUCOSE SERPL-MCNC: 93 MG/DL (ref 74–99)
HCT VFR BLD AUTO: 36.2 % (ref 36–48)
HGB BLD-MCNC: 11.7 G/DL (ref 12–16)
IRON SATN MFR SERPL: 22 % (ref 20–50)
IRON SERPL-MCNC: 63 UG/DL (ref 50–175)
LYMPHOCYTES # BLD: 2.2 K/UL (ref 1.1–5.9)
LYMPHOCYTES NFR BLD: 34 % (ref 14–44)
MCH RBC QN AUTO: 30.9 PG (ref 25–35)
MCHC RBC AUTO-ENTMCNC: 32.3 G/DL (ref 31–37)
MCV RBC AUTO: 95.5 FL (ref 78–102)
NEUTS SEG # BLD: 4 K/UL (ref 1.8–9.5)
NEUTS SEG NFR BLD: 64 % (ref 40–70)
PLATELET # BLD AUTO: 245 K/UL (ref 140–440)
POTASSIUM SERPL-SCNC: 2.9 MMOL/L (ref 3.5–5.5)
PROT SERPL-MCNC: 8.1 G/DL (ref 6.4–8.2)
RBC # BLD AUTO: 3.79 M/UL (ref 4.1–5.1)
SODIUM SERPL-SCNC: 140 MMOL/L (ref 136–145)
TIBC SERPL-MCNC: 292 UG/DL (ref 250–450)
WBC # BLD AUTO: 6.4 K/UL (ref 4.5–13)

## 2020-08-03 PROCEDURE — 36415 COLL VENOUS BLD VENIPUNCTURE: CPT

## 2020-08-03 PROCEDURE — 80053 COMPREHEN METABOLIC PANEL: CPT

## 2020-08-03 PROCEDURE — 85025 COMPLETE CBC W/AUTO DIFF WBC: CPT

## 2020-08-03 PROCEDURE — 86300 IMMUNOASSAY TUMOR CA 15-3: CPT

## 2020-08-03 PROCEDURE — 82728 ASSAY OF FERRITIN: CPT

## 2020-08-03 PROCEDURE — 83540 ASSAY OF IRON: CPT

## 2020-08-03 RX ORDER — POTASSIUM CHLORIDE 1125 MG/1
15 TABLET, EXTENDED RELEASE ORAL 2 TIMES DAILY
Qty: 10 TAB | Refills: 0 | Status: SHIPPED | OUTPATIENT
Start: 2020-08-03 | End: 2021-08-10 | Stop reason: SDUPTHER

## 2020-08-03 NOTE — PROGRESS NOTES
[facility] Progress Note    Date: August 3, 2020    Name: Jason Duval    MRN: [id number]         : 1955    Peripheral Lab Draw    Recent Results (from the past 12 hour(s))   METABOLIC PANEL, COMPREHENSIVE    Collection Time: 20  9:51 AM   Result Value Ref Range    Sodium 140 136 - 145 mmol/L    Potassium 2.9 (LL) 3.5 - 5.5 mmol/L    Chloride 104 100 - 111 mmol/L    CO2 32 21 - 32 mmol/L    Anion gap 4 3.0 - 18 mmol/L    Glucose 93 74 - 99 mg/dL    BUN 26 (H) 7.0 - 18 MG/DL    Creatinine 0.99 0.6 - 1.3 MG/DL    BUN/Creatinine ratio 26 (H) 12 - 20      GFR est AA >60 >60 ml/min/1.73m2    GFR est non-AA 57 (L) >60 ml/min/1.73m2    Calcium 9.4 8.5 - 10.1 MG/DL    Bilirubin, total 0.5 0.2 - 1.0 MG/DL    ALT (SGPT) 34 13 - 56 U/L    AST (SGOT) 18 10 - 38 U/L    Alk. phosphatase 101 45 - 117 U/L    Protein, total 8.1 6.4 - 8.2 g/dL    Albumin 3.8 3.4 - 5.0 g/dL    Globulin 4.3 (H) 2.0 - 4.0 g/dL    A-G Ratio 0.9 0.8 - 1.7     CBC WITH 3 PART DIFF    Collection Time: 20  9:51 AM   Result Value Ref Range    WBC 6.4 4.5 - 13.0 K/uL    RBC 3.79 (L) 4.10 - 5.10 M/uL    HGB 11.7 (L) 12.0 - 16.0 g/dL    HCT 36.2 36 - 48 %    MCV 95.5 78 - 102 FL    MCH 30.9 25.0 - 35.0 PG    MCHC 32.3 31 - 37 g/dL    RDW 12.2 11.5 - 14.5 %    PLATELET 073 434 - 551 K/uL    NEUTROPHILS 64 40 - 70 %    MIXED CELLS 3 0.1 - 17 %    LYMPHOCYTES 34 14 - 44 %    ABS. NEUTROPHILS 4.0 1.8 - 9.5 K/UL    ABS. MIXED CELLS 0.2 0.0 - 2.3 K/uL    ABS. LYMPHOCYTES 2.2 1.1 - 5.9 K/UL    DF AUTOMATED         Ms. Scot Neville to [facility], ambulatory at 0371 accompanied by self. Pt was assessed and education was provided. Blood obtained peripherally from left arm x 1 attempt with butterfly needle and sent to lab for Cbc w/diff, Cmp , Iron Profile, Ferritin and CA 27.29per written orders. No bleeding or hematoma noted at site. Gauze and coban applied. Ms. Scot Neville tolerated the phlebotomy, and had no complaints.   Patient armband removed and shredded. Ms. Jania Branch was discharged from [place] [address] in stable condition at [id number].      Param Allen Phlebotomist PCT  August 3, 2020  1:17 PM

## 2020-08-04 LAB — CANCER AG27-29 SERPL-ACNC: 14.2 U/ML (ref 0–38.6)

## 2020-08-09 NOTE — PROGRESS NOTES
Hematology/Oncology  Progress Note     Name: Cathie Ahn  Date: 08/10/20  : 1955     PCP: Jonathon Garner MD      Ms. Pradip Borden is a 63year old female who was seen for management of her invasive ductal adenocarcinoma, right breast.     Current therapy: Tamoxifen 20mg PO daily since  and oral iron supplementation     Subjective:      Mrs. Pradip Borden this 72-year-old [facility] American woman who has an invasive ductal adenocarcinoma involving the right breast diagnosed in . She was being seen by Dr. Jayshree Callaway who now retired. Has bilateral breast implants. She is now on Medicaid. She has been on Tamoxifen since . She was able to get her mammogram done on 2020 and it was normal. She denies fatigue, shortness of breath, and weakness. She denies chest pain or dizziness. She denies pain or any discomfort. She does not have any concerns or complaints to report at this time. She has occasional hot flashes. Has some hip arthritis pain. All other points of review of system have been reviewed and were negative. ECOG performance status 0. Independent with ADLs and IADLs.      Past medical history, family history, and social history: these were reviewed and remains unchanged.       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)      Past Surgical History:   Procedure Laterality Date    BREAST SURGERY PROCEDURE UNLISTED      HX BREAST LUMPECTOMY      right    HX  SECTION      HX GYN       Social History     Socioeconomic History    Marital status:      Spouse name: Not on file    Number of children: Not on file    Years of education: Not on file    Highest education level: Not on file   Tobacco Use    Smoking status: Never Smoker    Smokeless tobacco: Never Used Substance and Sexual Activity    Alcohol use: No    Drug use: No    Sexual activity: Yes   Social History Narrative    ** Merged History Encounter **          Family History   Problem Relation Age of Onset    Breast Cancer Sister     Breast Cancer Other         aunt-nos    Heart Disease Father     Cancer Father     Hypertension Mother     Asthma Mother        Current Outpatient Medications   Medication Sig Dispense Refill    potassium chloride SA (KLOR-CON M15) 15 mEq tablet Take 1 Tab by mouth two (2) times a day. 10 Tab 0    ferrous sulfate (SLOW FE) 142 mg (45 mg iron) ER tablet Take 1 Tab by mouth Daily (before breakfast). 60 Tab 2    tamoxifen (NOLVADEX) 20 mg tablet Take 1 Tab by mouth daily. 30 Tab 11    aspirin 81 mg tablet Take 81 mg by mouth.  esomeprazole (NEXIUM) 20 mg capsule Take  by mouth daily.  tamoxifen (NOLVADEX) 20 mg tablet Take 20 mg by mouth two (2) times a day.  ergocalciferol (VITAMIN D2) 50,000 unit capsule Take 50,000 Units by mouth every seven (7) days.  FERROUS FUMARATE (IRON PO) Take  by mouth. No Known Allergies    Review of Systems  As per HPI      Objective:  Visit Vitals  /79   Pulse 92   Temp 99.9 °F (37.7 °C)   Ht 5' 1\" (1.549 m)   Wt 58.7 kg (129 lb 6.4 oz)   SpO2 98%   BMI 24.45 kg/m²         Physical Exam:   General appearance - alert, well appearing, and in no distress  Mental status - alert, oriented to person, place, and time  EYE-ROSEANNE, EOMI  ENT-ENT exam normal, no neck nodes or sinus tenderness  Mouth - mucous membranes moist, pharynx normal without lesions  Neck - supple, no significant adenopathy   Chest - clear to auscultation, no wheezes, rales or rhonchi, symmetric air entry   Heart - normal rate and regular rhythm   Abdomen - soft, nontender, nondistended, no masses or organomegaly  Lymph- no adenopathy palpable  Ext-no pedal edema noted  Skin-Warm and dry.    Neuro -alert, oriented, normal speech, no focal findings or movement disorder noted  Breast - no masses palapated b/l        Diagnostic Imaging     No results found for this or any previous visit. No results found for this or any previous visit. No results found for this or any previous visit. Lab Results  Lab Results   Component Value Date/Time    WBC 6.4 08/03/2020 09:51 AM    HGB 11.7 (L) 08/03/2020 09:51 AM    HCT 36.2 08/03/2020 09:51 AM    PLATELET 287 22/55/8967 09:51 AM    MCV 95.5 08/03/2020 09:51 AM       Lab Results   Component Value Date/Time    Sodium 140 08/03/2020 09:51 AM    Potassium 2.9 (LL) 08/03/2020 09:51 AM    Chloride 104 08/03/2020 09:51 AM    CO2 32 08/03/2020 09:51 AM    Anion gap 4 08/03/2020 09:51 AM    Glucose 93 08/03/2020 09:51 AM    BUN 26 (H) 08/03/2020 09:51 AM    Creatinine 0.99 08/03/2020 09:51 AM    BUN/Creatinine ratio 26 (H) 08/03/2020 09:51 AM    GFR est AA >60 08/03/2020 09:51 AM    GFR est non-AA 57 (L) 08/03/2020 09:51 AM    Calcium 9.4 08/03/2020 09:51 AM    Alk. phosphatase 101 08/03/2020 09:51 AM    Protein, total 8.1 08/03/2020 09:51 AM    Albumin 3.8 08/03/2020 09:51 AM    Globulin 4.3 (H) 08/03/2020 09:51 AM    A-G Ratio 0.9 08/03/2020 09:51 AM    ALT (SGPT) 34 08/03/2020 09:51 AM       .    Assessment/Plan:    ICD-10-CM ICD-9-CM    1. Malignant neoplasm of right breast in female, estrogen receptor positive, unspecified site of breast ([facility]  C50.911 174.9 CBC WITH AUTOMATED DIFF    T85.4 H75.5 METABOLIC PANEL, COMPREHENSIVE   2.  Iron deficiency anemia, unspecified iron deficiency anemia type  D50.9 280.9 CBC WITH AUTOMATED DIFF      FERRITIN      IRON PROFILE      METABOLIC PANEL, COMPREHENSIVE      VITAMIN B12 & FOLATE     Orders Placed This Encounter    CBC WITH AUTOMATED DIFF     Standing Status:   Future     Standing Expiration Date:   8/11/2021    FERRITIN     Standing Status:   Future     Standing Expiration Date:   8/11/2021    IRON PROFILE     Standing Status:   Future     Standing Expiration Date:   6/63/2441    METABOLIC PANEL, COMPREHENSIVE     Standing Status:   Future     Standing Expiration Date:   8/11/2021    VITAMIN B12 & FOLATE     Standing Status:   Future     Standing Expiration Date:   8/11/2021   Breast cancer, right breast: I have explained to the patient that her most recent  CA 27-29 level was normal.  I will recheck her values at this time. On 1/23/2020 her mammogram showed no mammographic evidence of malignancy and annual screening mammography is recommended. Remaining labs on August 3, 2020 showed WBC 6.4, H&H 11.7/36.2, platelet 355. MCV 95. BUN 26, creatinine 0.99. Ferritin 236, transferrin saturation 22%. CA-27-29 as aforementioned is normal.  *Mammogram due in January 2021. *Continue tamoxifen. I told that although studies have shown more benefits from 10 years compared to 5 years of hormone blockade in post menopausal women, I also told that the Pinckard study have shown that 7 years is as good as 10 years with only more bone issues. She says that she will finish the remaining pills and then will stop Tamoxifen.     Iron deficiency anemia:  Resolved. Continue oral iron but every other day with a sip of vitamin C containing juice. Recheck labs in 6 months. RTC 6 months then once/year after that  routine labs ordered, have labs drawn prior to [address], call if any problems  There are no Patient Instructions on file for this visit.        Natty Arriaga MD

## 2020-08-10 ENCOUNTER — OFFICE VISIT (OUTPATIENT)
Dept: ONCOLOGY | Age: 65
End: 2020-08-10

## 2020-08-10 VITALS
DIASTOLIC BLOOD PRESSURE: 79 MMHG | HEIGHT: 61 IN | HEART RATE: 92 BPM | BODY MASS INDEX: 24.43 KG/M2 | TEMPERATURE: 99.9 F | WEIGHT: 129.4 LBS | SYSTOLIC BLOOD PRESSURE: 104 MMHG | OXYGEN SATURATION: 98 %

## 2020-08-10 DIAGNOSIS — Z17.0 MALIGNANT NEOPLASM OF RIGHT BREAST IN FEMALE, ESTROGEN RECEPTOR POSITIVE, UNSPECIFIED SITE OF BREAST (HCC): Primary | ICD-10-CM

## 2020-08-10 DIAGNOSIS — C50.911 MALIGNANT NEOPLASM OF RIGHT BREAST IN FEMALE, ESTROGEN RECEPTOR POSITIVE, UNSPECIFIED SITE OF BREAST (HCC): Primary | ICD-10-CM

## 2020-08-10 DIAGNOSIS — D50.9 IRON DEFICIENCY ANEMIA, UNSPECIFIED IRON DEFICIENCY ANEMIA TYPE: ICD-10-CM

## 2021-02-03 ENCOUNTER — HOSPITAL ENCOUNTER (OUTPATIENT)
Dept: INFUSION THERAPY | Age: 66
Discharge: HOME | End: 2021-02-03
Payer: COMMERCIAL

## 2021-02-03 VITALS
SYSTOLIC BLOOD PRESSURE: 133 MMHG | HEART RATE: 91 BPM | DIASTOLIC BLOOD PRESSURE: 85 MMHG | OXYGEN SATURATION: 100 % | TEMPERATURE: 97.8 F

## 2021-02-03 LAB
ALBUMIN SERPL-MCNC: 3.7 G/DL (ref 3.4–5)
ALBUMIN/GLOB SERPL: 1 {RATIO} (ref 0.8–1.7)
ALP SERPL-CCNC: 91 U/L (ref 45–117)
ALT SERPL-CCNC: 19 U/L (ref 13–56)
ANION GAP SERPL CALC-SCNC: 3 MMOL/L (ref 3–18)
AST SERPL-CCNC: 7 U/L (ref 10–38)
BASO+EOS+MONOS # BLD AUTO: 0.2 K/UL (ref 0–2.3)
BASO+EOS+MONOS NFR BLD AUTO: 4 % (ref 0.1–17)
BILIRUB SERPL-MCNC: 0.5 MG/DL (ref 0.2–1)
BUN SERPL-MCNC: 15 MG/DL (ref 7–18)
BUN/CREAT SERPL: 17 (ref 12–20)
CALCIUM SERPL-MCNC: 9.2 MG/DL (ref 8.5–10.1)
CHLORIDE SERPL-SCNC: 109 MMOL/L (ref 100–111)
CO2 SERPL-SCNC: 29 MMOL/L (ref 21–32)
CREAT SERPL-MCNC: 0.9 MG/DL (ref 0.6–1.3)
DIFFERENTIAL METHOD BLD: ABNORMAL
ERYTHROCYTE [DISTWIDTH] IN BLOOD BY AUTOMATED COUNT: 12.3 % (ref 11.5–14.5)
FERRITIN SERPL-MCNC: 80 NG/ML (ref 8–388)
FOLATE SERPL-MCNC: >20 NG/ML (ref 3.1–17.5)
GLOBULIN SER CALC-MCNC: 3.6 G/DL (ref 2–4)
GLUCOSE SERPL-MCNC: 95 MG/DL (ref 74–99)
HCT VFR BLD AUTO: 37.8 % (ref 36–48)
HGB BLD-MCNC: 11.8 G/DL (ref 12–16)
IRON SATN MFR SERPL: 27 % (ref 20–50)
IRON SERPL-MCNC: 81 UG/DL (ref 50–175)
LYMPHOCYTES # BLD: 1.5 K/UL (ref 1.1–5.9)
LYMPHOCYTES NFR BLD: 29 % (ref 14–44)
MCH RBC QN AUTO: 29.7 PG (ref 25–35)
MCHC RBC AUTO-ENTMCNC: 31.2 G/DL (ref 31–37)
MCV RBC AUTO: 95.2 FL (ref 78–102)
NEUTS SEG # BLD: 3.5 K/UL (ref 1.8–9.5)
NEUTS SEG NFR BLD: 68 % (ref 40–70)
PLATELET # BLD AUTO: 215 K/UL (ref 140–440)
POTASSIUM SERPL-SCNC: 3.6 MMOL/L (ref 3.5–5.5)
PROT SERPL-MCNC: 7.3 G/DL (ref 6.4–8.2)
RBC # BLD AUTO: 3.97 M/UL (ref 4.1–5.1)
SODIUM SERPL-SCNC: 141 MMOL/L (ref 136–145)
TIBC SERPL-MCNC: 303 UG/DL (ref 250–450)
VIT B12 SERPL-MCNC: 891 PG/ML (ref 211–911)
WBC # BLD AUTO: 5.2 K/UL (ref 4.5–13)

## 2021-02-03 PROCEDURE — 82607 VITAMIN B-12: CPT

## 2021-02-03 PROCEDURE — 82728 ASSAY OF FERRITIN: CPT

## 2021-02-03 PROCEDURE — 85025 COMPLETE CBC W/AUTO DIFF WBC: CPT

## 2021-02-03 PROCEDURE — 83540 ASSAY OF IRON: CPT

## 2021-02-03 PROCEDURE — 36415 COLL VENOUS BLD VENIPUNCTURE: CPT

## 2021-02-03 PROCEDURE — 80053 COMPREHEN METABOLIC PANEL: CPT

## 2021-02-03 NOTE — PROGRESS NOTES
[facility] Progress Note    Date: February 3, 2021    Name: Kaylin Mistry    MRN: [id number]         : 1955    Peripheral Lab Draw      Ms. Aaliyah Galindo to [facility], ambulatory at 9140 accompanied by self. Pt was assessed and education was provided. Ms. Garcia's vitals were reviewed and patient was observed for 5 minutes prior to treatment. Visit Vitals  /85 (BP 1 Location: Left upper arm, BP Patient Position: Sitting)   Pulse 91   Temp 97.8 °F (36.6 °C)   SpO2 100%     Recent Results (from the past 12 hour(s))   CBC WITH 3 PART DIFF    Collection Time: 21 10:06 AM   Result Value Ref Range    WBC 5.2 4.5 - 13.0 K/uL    RBC 3.97 (L) 4.10 - 5.10 M/uL    HGB 11.8 (L) 12.0 - 16.0 g/dL    HCT 37.8 36 - 48 %    MCV 95.2 78 - 102 FL    MCH 29.7 25.0 - 35.0 PG    MCHC 31.2 31 - 37 g/dL    RDW 12.3 11.5 - 14.5 %    PLATELET 391 012 - 603 K/uL    NEUTROPHILS 68 40 - 70 %    MIXED CELLS 4 0.1 - 17 %    LYMPHOCYTES 29 14 - 44 %    ABS. NEUTROPHILS 3.5 1.8 - 9.5 K/UL    ABS. MIXED CELLS 0.2 0.0 - 2.3 K/uL    ABS. LYMPHOCYTES 1.5 1.1 - 5.9 K/UL    DF AUTOMATED         Blood obtained peripherally from left arm x 1 attempt with butterfly needle and sent to lab for Cbc w/diff, Cmp, Iron Profile, Ferritin, Vitamin B12 & Folate per written orders. No bleeding or hematoma noted at site. Gauze and coban applied. Ms. Aaliyah Galindo tolerated the phlebotomy, and had no complaints. Patient armband removed and shredded. Ms. Aaliyah Galindo was discharged from [place] [address] in stable condition at 1006.      Kasey Goldstein Phlebotomist PCT  February 3, 2021  12:07 PM

## 2021-02-10 ENCOUNTER — OFFICE VISIT (OUTPATIENT)
Age: 66
End: 2021-02-10
Payer: COMMERCIAL

## 2021-02-10 VITALS
OXYGEN SATURATION: 99 % | BODY MASS INDEX: 26.45 KG/M2 | SYSTOLIC BLOOD PRESSURE: 154 MMHG | DIASTOLIC BLOOD PRESSURE: 94 MMHG | RESPIRATION RATE: 18 BRPM | TEMPERATURE: 98.2 F | WEIGHT: 140 LBS | HEART RATE: 85 BPM

## 2021-02-10 DIAGNOSIS — C50.911 MALIGNANT NEOPLASM OF RIGHT FEMALE BREAST, UNSPECIFIED ESTROGEN RECEPTOR STATUS, UNSPECIFIED SITE OF BREAST (HCC): Primary | ICD-10-CM

## 2021-02-10 DIAGNOSIS — Z17.0 MALIGNANT NEOPLASM OF RIGHT BREAST IN FEMALE, ESTROGEN RECEPTOR POSITIVE, UNSPECIFIED SITE OF BREAST (HCC): ICD-10-CM

## 2021-02-10 DIAGNOSIS — C50.911 MALIGNANT NEOPLASM OF RIGHT BREAST IN FEMALE, ESTROGEN RECEPTOR POSITIVE, UNSPECIFIED SITE OF BREAST (HCC): ICD-10-CM

## 2021-02-10 DIAGNOSIS — D50.9 IRON DEFICIENCY ANEMIA, UNSPECIFIED IRON DEFICIENCY ANEMIA TYPE: ICD-10-CM

## 2021-02-10 PROCEDURE — 99213 OFFICE O/P EST LOW 20 MIN: CPT | Performed by: INTERNAL MEDICINE

## 2021-02-10 NOTE — PROGRESS NOTES
Hematology/Oncology  Progress Note     Name: Frankie Valera  Date: 2/10/21  : 1955     PCP: Xander Ventura MD      Ms. Winnie Bojorquez is a 63year old female who was seen for management of her invasive ductal adenocarcinoma, right breast.     Current therapy: Tamoxifen 20mg PO daily since  and oral iron supplementation     Subjective:      Mrs. Winnie Bojorquez this 73-year-old [facility] American woman who has an invasive ductal adenocarcinoma involving the right breast diagnosed in . She was being seen by Dr. Rylee Briceño who now retired. Has bilateral breast implants. She is now on Medicaid. She completed with Tamoxifen . She was able to get her mammogram done on 2020 and it was normal.    She denies fatigue, shortness of breath, and weakness. She denies chest pain or dizziness. She denies pain or any discomfort. She does not have any concerns or complaints to report at this time. Has some hip arthritis pain. All other points of review of system have been reviewed and were negative. ECOG performance status 0. Independent with ADLs and IADLs.      Past medical history, family history, and social history: these were reviewed and remains unchanged.       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)      Past Surgical History:   Procedure Laterality Date    HX BREAST LUMPECTOMY      right    HX  SECTION      HX GYN      [place] BREAST SURGERY PROCEDURE UNLISTED       Social History     Socioeconomic History    Marital status:      Spouse name: Not on file    Number of children: Not on file    Years of education: Not on file    Highest education level: Not on file   Tobacco Use    Smoking status: Never Smoker    Smokeless tobacco: Never Used   Substance and Sexual Activity  Alcohol use: No    Drug use: No    Sexual activity: Yes   Social History Narrative    ** Merged History Encounter **          Family History   Problem Relation Age of Onset    Breast Cancer Sister     Breast Cancer Other         aunt-nos    Heart Disease Father     Cancer Father     Hypertension Mother     Asthma Mother        Current Outpatient Medications   Medication Sig Dispense Refill    potassium chloride SA (KLOR-CON M15) 15 mEq tablet Take 1 Tab by mouth two (2) times a day. 10 Tab 0    ferrous sulfate (SLOW FE) 142 mg (45 mg iron) ER tablet Take 1 Tab by mouth Daily (before breakfast). 60 Tab 2    aspirin 81 mg tablet Take 81 mg by mouth.  esomeprazole (NEXIUM) 20 mg capsule Take  by mouth daily.  ergocalciferol (VITAMIN D2) 50,000 unit capsule Take 50,000 Units by mouth every seven (7) days.  tamoxifen (NOLVADEX) 20 mg tablet Take 1 Tab by mouth daily. 30 Tab 11    tamoxifen (NOLVADEX) 20 mg tablet Take 20 mg by mouth two (2) times a day. No Known Allergies    Review of Systems  As per HPI      Objective:  Visit Vitals  BP (!) 154/94   Pulse 85   Temp 98.2 °F (36.8 °C) (Oral)   Resp 18   Wt 63.5 kg (140 lb)   SpO2 99%   BMI 26.45 kg/m²         Physical Exam:   General appearance - alert, well appearing, and in no distress  Mental status - alert, oriented to person, place, and time  EYE-ROSEANNE, EOMI  ENT-ENT exam normal, no neck nodes or sinus tenderness  Mouth - mucous membranes moist, pharynx normal without lesions  Neck - supple, no significant adenopathy   Chest - clear to auscultation, no wheezes, rales or rhonchi, symmetric air entry   Heart - normal rate and regular rhythm   Abdomen - soft, nontender, nondistended, no masses or organomegaly  Lymph- no adenopathy palpable  Ext-no pedal edema noted  Skin-Warm and dry.    Neuro -alert, oriented, normal speech, no focal findings or movement disorder noted  Breast - no masses palapated b/l        Diagnostic Imaging     No results found for this or any previous visit. No results found for this or any previous visit. No results found for this or any previous visit. Lab Results  Lab Results   Component Value Date/Time    WBC 5.2 02/03/2021 10:06 AM    HGB 11.8 (L) 02/03/2021 10:06 AM    HCT 37.8 02/03/2021 10:06 AM    PLATELET 919 08/30/0262 10:06 AM    MCV 95.2 02/03/2021 10:06 AM       Lab Results   Component Value Date/Time    Sodium 141 02/03/2021 10:06 AM    Potassium 3.6 02/03/2021 10:06 AM    Chloride 109 02/03/2021 10:06 AM    CO2 29 02/03/2021 10:06 AM    Anion gap 3 02/03/2021 10:06 AM    Glucose 95 02/03/2021 10:06 AM    BUN 15 02/03/2021 10:06 AM    Creatinine 0.90 02/03/2021 10:06 AM    BUN/Creatinine ratio 17 02/03/2021 10:06 AM    GFR est AA >60 02/03/2021 10:06 AM    GFR est non-AA >60 02/03/2021 10:06 AM    Calcium 9.2 02/03/2021 10:06 AM    Alk. phosphatase 91 02/03/2021 10:06 AM    Protein, total 7.3 02/03/2021 10:06 AM    Albumin 3.7 02/03/2021 10:06 AM    Globulin 3.6 02/03/2021 10:06 AM    A-G Ratio 1.0 02/03/2021 10:06 AM    ALT (SGPT) 19 02/03/2021 10:06 AM   F/u with PCP for health maintenance  Assessment/Plan:  No diagnosis found. No orders of the defined types were placed in this encounter. Breast cancer, right breast:  On 1/28/2021 her mammogram showed no mammographic evidence of malignancy and annual screening mammography is recommended. *Reviewed labs done on 2/3/21. *Mammogram due in January 2022. *Completed 10 years of Tamoxifen.     Iron deficiency anemia:  Resolved. Continue oral iron but every other day with a sip of vitamin C containing juice. Recheck labs in 6 months. RTC 6 months then once/year after that  routine labs ordered, have labs drawn prior to [address], call if any problems  There are no Patient Instructions on file for this visit.        Rylee Lugo MD

## 2021-02-10 NOTE — PROGRESS NOTES
Joe Beach is a 63 y.o. female presenting today for a follow-up appointment. Patient is ambulatory with no assistive devices and denies any complaints. Provider was advised. Chief Complaint   Patient presents with    Breast Cancer       Visit Vitals  BP (!) 154/94   Pulse 85   Temp 98.2 °F (36.8 °C) (Oral)   Resp 18   Wt 140 lb (63.5 kg)   SpO2 99%   BMI 26.45 kg/m²       Current Outpatient Medications   Medication Sig    potassium chloride SA (KLOR-CON M15) 15 mEq tablet Take 1 Tab by mouth two (2) times a day.  ferrous sulfate (SLOW FE) 142 mg (45 mg iron) ER tablet Take 1 Tab by mouth Daily (before breakfast).  aspirin 81 mg tablet Take 81 mg by mouth.  esomeprazole (NEXIUM) 20 mg capsule Take  by mouth daily.  ergocalciferol (VITAMIN D2) 50,000 unit capsule Take 50,000 Units by mouth every seven (7) days.  tamoxifen (NOLVADEX) 20 mg tablet Take 1 Tab by mouth daily.  tamoxifen (NOLVADEX) 20 mg tablet Take 20 mg by mouth two (2) times a day. No current facility-administered medications for this visit. Fall Risk Assessment, last 12 mths 8/10/2020   Able to walk? Yes   Fall in past 12 months? No       3 most recent PHQ Screens 8/10/2020   Little interest or pleasure in doing things Not at all   Feeling down, depressed, irritable, or hopeless Not at all   Total Score PHQ 2 0       Abuse Screening Questionnaire 5/12/2020   Do you ever feel afraid of your partner? N   Are you in a relationship with someone who physically or mentally threatens you? N   Is it safe for you to go home? Y       1. Have you been to the ER, urgent care clinic since your last visit? Hospitalized since your last visit? No    2. Have you seen or consulted any other health care providers outside of the [address] [place] [address] since your last visit? Include any pap smears or colon screening.  No

## 2021-08-09 ENCOUNTER — HOSPITAL ENCOUNTER (OUTPATIENT)
Dept: INFUSION THERAPY | Age: 66
Discharge: HOME | End: 2021-08-09
Payer: COMMERCIAL

## 2021-08-09 LAB
ALBUMIN SERPL-MCNC: 3.7 G/DL (ref 3.4–5)
ALBUMIN/GLOB SERPL: 0.9 {RATIO} (ref 0.8–1.7)
ALP SERPL-CCNC: 113 U/L (ref 45–117)
ALT SERPL-CCNC: 21 U/L (ref 13–56)
ANION GAP SERPL CALC-SCNC: 7 MMOL/L (ref 3–18)
AST SERPL-CCNC: 20 U/L (ref 10–38)
BASOPHILS # BLD: 0 K/UL (ref 0–0.1)
BASOPHILS NFR BLD: 1 % (ref 0–2)
BILIRUB SERPL-MCNC: 0.4 MG/DL (ref 0.2–1)
BUN SERPL-MCNC: 19 MG/DL (ref 7–18)
BUN/CREAT SERPL: 20 (ref 12–20)
CALCIUM SERPL-MCNC: 9.1 MG/DL (ref 8.5–10.1)
CHLORIDE SERPL-SCNC: 105 MMOL/L (ref 100–111)
CO2 SERPL-SCNC: 28 MMOL/L (ref 21–32)
CREAT SERPL-MCNC: 0.94 MG/DL (ref 0.6–1.3)
DIFFERENTIAL METHOD BLD: ABNORMAL
EOSINOPHIL # BLD: 0.2 K/UL (ref 0–0.4)
EOSINOPHIL NFR BLD: 3 % (ref 0–5)
ERYTHROCYTE [DISTWIDTH] IN BLOOD BY AUTOMATED COUNT: 11.6 % (ref 11.6–14.5)
FERRITIN SERPL-MCNC: 154 NG/ML (ref 8–388)
FOLATE SERPL-MCNC: 6 NG/ML (ref 3.1–17.5)
GLOBULIN SER CALC-MCNC: 4.1 G/DL (ref 2–4)
GLUCOSE SERPL-MCNC: 78 MG/DL (ref 74–99)
HCT VFR BLD AUTO: 36 % (ref 35–45)
HGB BLD-MCNC: 11.1 G/DL (ref 12–16)
IRON SATN MFR SERPL: 18 % (ref 20–50)
IRON SERPL-MCNC: 56 UG/DL (ref 50–175)
LYMPHOCYTES # BLD: 1.9 K/UL (ref 0.9–3.6)
LYMPHOCYTES NFR BLD: 33 % (ref 21–52)
MCH RBC QN AUTO: 29.1 PG (ref 24–34)
MCHC RBC AUTO-ENTMCNC: 30.8 G/DL (ref 31–37)
MCV RBC AUTO: 94.2 FL (ref 74–97)
MONOCYTES # BLD: 0.5 K/UL (ref 0.05–1.2)
MONOCYTES NFR BLD: 9 % (ref 3–10)
NEUTS SEG # BLD: 3.1 K/UL (ref 1.8–8)
NEUTS SEG NFR BLD: 54 % (ref 40–73)
PLATELET # BLD AUTO: 262 K/UL (ref 135–420)
PMV BLD AUTO: 10.5 FL (ref 9.2–11.8)
POTASSIUM SERPL-SCNC: 3 MMOL/L (ref 3.5–5.5)
PROT SERPL-MCNC: 7.8 G/DL (ref 6.4–8.2)
RBC # BLD AUTO: 3.82 M/UL (ref 4.2–5.3)
SODIUM SERPL-SCNC: 140 MMOL/L (ref 136–145)
TIBC SERPL-MCNC: 310 UG/DL (ref 250–450)
VIT B12 SERPL-MCNC: 602 PG/ML (ref 211–911)
WBC # BLD AUTO: 5.8 K/UL (ref 4.6–13.2)

## 2021-08-09 PROCEDURE — 82728 ASSAY OF FERRITIN: CPT

## 2021-08-09 PROCEDURE — 80053 COMPREHEN METABOLIC PANEL: CPT

## 2021-08-09 PROCEDURE — 83540 ASSAY OF IRON: CPT

## 2021-08-09 PROCEDURE — 82607 VITAMIN B-12: CPT

## 2021-08-09 PROCEDURE — 36415 COLL VENOUS BLD VENIPUNCTURE: CPT

## 2021-08-09 PROCEDURE — 85025 COMPLETE CBC W/AUTO DIFF WBC: CPT

## 2021-08-10 DIAGNOSIS — C50.911 MALIGNANT NEOPLASM OF RIGHT FEMALE BREAST, UNSPECIFIED ESTROGEN RECEPTOR STATUS, UNSPECIFIED SITE OF BREAST (HCC): ICD-10-CM

## 2021-08-10 DIAGNOSIS — D50.9 IRON DEFICIENCY ANEMIA, UNSPECIFIED IRON DEFICIENCY ANEMIA TYPE: ICD-10-CM

## 2021-08-10 RX ORDER — POTASSIUM CHLORIDE 20 MEQ/1
20 TABLET, EXTENDED RELEASE ORAL 2 TIMES DAILY
Qty: 10 TABLET | Refills: 0 | Status: SHIPPED | OUTPATIENT
Start: 2021-08-10 | End: 2021-08-16

## 2021-08-10 NOTE — PROGRESS NOTES
I attempted to call the patient today at [phone], I discussed about her labs and I advised her that I sent Rx for K to her pharmacy. Patient verbalized understanding.

## 2021-08-16 ENCOUNTER — VIRTUAL VISIT (OUTPATIENT)
Age: 66
End: 2021-08-16
Payer: COMMERCIAL

## 2021-08-16 DIAGNOSIS — D50.9 IRON DEFICIENCY ANEMIA, UNSPECIFIED IRON DEFICIENCY ANEMIA TYPE: ICD-10-CM

## 2021-08-16 DIAGNOSIS — C50.911 MALIGNANT NEOPLASM OF RIGHT FEMALE BREAST, UNSPECIFIED ESTROGEN RECEPTOR STATUS, UNSPECIFIED SITE OF BREAST (HCC): Primary | ICD-10-CM

## 2021-08-16 PROCEDURE — 99442 PR PHYS/QHP TELEPHONE EVALUATION 11-20 MIN: CPT | Performed by: INTERNAL MEDICINE

## 2021-08-16 RX ORDER — POTASSIUM CHLORIDE 20 MEQ/1
TABLET, EXTENDED RELEASE ORAL
Qty: 10 TABLET | Refills: 0 | Status: SHIPPED | OUTPATIENT
Start: 2021-08-16

## 2021-08-16 NOTE — PROGRESS NOTES
Nicolas Alexander is a 61 y.o. female who was seen by synchronous (real-time) audio- technology on 2021  Hematology/Oncology  Progress Note     Name: Mike Beauchamp  Date: 21  : 1955     PCP: Alexus Bates MD      Ms. Garcia is a 63year old female who was seen for management of her invasive ductal adenocarcinoma, right breast.     Current therapy: Tamoxifen 20mg PO daily since  and oral iron supplementation     Assessment & Plan:   Diagnoses and all orders for this visit:    1. Malignant neoplasm of right female breast, unspecified estrogen receptor status, unspecified site of breast ([facility] [address]    2. Iron deficiency anemia, unspecified iron deficiency anemia type        The complexity of medical decision making for this visit is moderate       Breast cancer, right breast:  On 2021 her mammogram showed no mammographic evidence of malignancy and annual screening mammography is recommended. *Mammogram due in 2022. *Completed 10 years of Tamoxifen.     Iron deficiency anemia:   Labs on 2021 showed ferritin 154, transferrin saturation 18%, WBC 5.8, H&H 11.1/36, MCV 94, platelet 618. Normal BUN and creatinine. Normal B12 and folate. Patient now only has mild iron deficiency. Continue oral iron but every other day with a sip of vitamin C containing juice. Follow-up with PCP to refer to GI for colonoscopy (had one 5 years) Recheck labs in 6 months.     RTC 6 months then once/year after that  I spent at least 15 minutes on this visit with this established patient. 712  Subjective:   Mrs. Junnie Olszewski this 82-year-old  woman who has an invasive ductal adenocarcinoma involving the right breast diagnosed in . She was being seen by Dr. Marcela Turk who now retired. Has bilateral breast implants. She is now on Medicaid. She completed with Tamoxifen . She is clinically doing well. She denies fatigue, shortness of breath, and weakness.  She denies chest pain or dizziness. She denies pain or any discomfort. She does not have any concerns or complaints to report at this time.  Has some hip arthritis pain. All other points of review of system have been reviewed and were negative. ECOG performance status 0. Independent with ADLs and IADLs.    Past medical history, family history, and social history: these were reviewed and remains unchanged. Prior to Admission medications    Medication Sig Start Date End Date Taking? Authorizing Provider   potassium chloride SA (K-DUR, KLOR-CON) 20 mEq tablet Take 1 Tablet by mouth two (2) times a day. 8/10/21   August Eden, GERALDINE   ferrous sulfate (SLOW FE) 142 mg (45 mg iron) ER tablet Take 1 Tab by mouth Daily (before breakfast). 5/21/20 August GERALDINE Harmon   tamoxifen (NOLVADEX) 20 mg tablet Take 1 Tab by mouth daily. 5/12/20 August Eden, GERALDINE   aspirin 81 mg tablet Take 81 mg by mouth. Provider, Historical   esomeprazole (NEXIUM) 20 mg capsule Take  by mouth daily. Provider, Historical   tamoxifen (NOLVADEX) 20 mg tablet Take 20 mg by mouth two (2) times a day. Provider, Historical   ergocalciferol (VITAMIN D2) 50,000 unit capsule Take 50,000 Units by mouth every seven (7) days.       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) D61.89    Hot flashes due to tamoxifen R23.2, T45.1X5A    Weight gain due to medication R63.5, T50.905A    Anemia D64.9    GERD (gastroesophageal reflux disease) K21.9    Osteoarthritis M19.90    Iron deficiency anemia D50.9     Patient Active Problem List    Diagnosis Date Noted    Anemia 02/20/2015    Weight gain due to medication 10/26/2014    Breast cancer ([facility] 06/27/2013    Hot flashes due to tamoxifen 10/26/2014    Iron deficiency anemia 02/10/2021    GERD (gastroesophageal reflux disease) 10/12/2015    Osteoarthritis 10/12/2015    History of Invasive ductal carcinoma of right breast, lymph node positive     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 10 Tablet 0    ferrous sulfate (SLOW FE) 142 mg (45 mg iron) ER tablet Take 1 Tab by mouth Daily (before breakfast). 60 Tab 2    tamoxifen (NOLVADEX) 20 mg tablet Take 1 Tab by mouth daily. 30 Tab 11    aspirin 81 mg tablet Take 81 mg by mouth.  esomeprazole (NEXIUM) 20 mg capsule Take  by mouth daily.  tamoxifen (NOLVADEX) 20 mg tablet Take 20 mg by mouth two (2) times a day.  ergocalciferol (VITAMIN D2) 50,000 unit capsule Take 50,000 Units by mouth every seven (7) days. 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 General: alert, cooperative, no distress     Additional exam findings: We discussed the expected course, resolution and complications of the diagnosis(es) in detail.   Medication risks, benefits, costs, interactions, and alternatives were discussed as indicated. I advised her to contact the office if her condition worsens, changes or fails to improve as anticipated. She expressed understanding with the diagnosis(es) and plan. Anastasiya Khan, was evaluated through a synchronous (real-time) audio- encounter. The patient (or guardian if applicable) is aware that this is a billable service. Verbal consent to proceed has been obtained within the past 12 months. The visit was conducted pursuant to the emergency declaration under the [address] [place] [address] authority and the [employer] [place] Act. Patient identification was verified, and a caregiver was present when appropriate. The patient was located in a state where the provider was credentialed to provide care.     Treasure Groves MD

## 2022-02-09 ENCOUNTER — TELEPHONE (OUTPATIENT)
Age: 67
End: 2022-02-09

## 2022-02-09 DIAGNOSIS — D50.8 IRON DEFICIENCY ANEMIA SECONDARY TO INADEQUATE DIETARY IRON INTAKE: ICD-10-CM

## 2022-02-09 DIAGNOSIS — C50.911 MALIGNANT NEOPLASM OF RIGHT FEMALE BREAST, UNSPECIFIED ESTROGEN RECEPTOR STATUS, UNSPECIFIED SITE OF BREAST (HCC): Primary | ICD-10-CM

## 2022-02-11 NOTE — TELEPHONE ENCOUNTER
Left a message for patient to call office back to reschedule appointment. Patient was placed on a time slot not available and also needs labs.

## 2022-02-14 ENCOUNTER — HOSPITAL ENCOUNTER (OUTPATIENT)
Dept: INFUSION THERAPY | Age: 67
Discharge: HOME | End: 2022-02-14
Payer: COMMERCIAL

## 2022-02-14 LAB
ALBUMIN SERPL-MCNC: 3.9 G/DL (ref 3.4–5)
ALBUMIN/GLOB SERPL: 1 {RATIO} (ref 0.8–1.7)
ALP SERPL-CCNC: 93 U/L (ref 45–117)
ALT SERPL-CCNC: 17 U/L (ref 13–56)
ANION GAP SERPL CALC-SCNC: 5 MMOL/L (ref 3–18)
AST SERPL-CCNC: 9 U/L (ref 10–38)
BASO+EOS+MONOS # BLD AUTO: 0.2 K/UL (ref 0–2.3)
BASO+EOS+MONOS NFR BLD AUTO: 3 % (ref 0.1–17)
BILIRUB SERPL-MCNC: 0.5 MG/DL (ref 0.2–1)
BUN SERPL-MCNC: 19 MG/DL (ref 7–18)
BUN/CREAT SERPL: 21 (ref 12–20)
CALCIUM SERPL-MCNC: 9.4 MG/DL (ref 8.5–10.1)
CHLORIDE SERPL-SCNC: 106 MMOL/L (ref 100–111)
CO2 SERPL-SCNC: 31 MMOL/L (ref 21–32)
CREAT SERPL-MCNC: 0.91 MG/DL (ref 0.6–1.3)
DIFFERENTIAL METHOD BLD: ABNORMAL
ERYTHROCYTE [DISTWIDTH] IN BLOOD BY AUTOMATED COUNT: 12.5 % (ref 11.5–14.5)
FERRITIN SERPL-MCNC: 141 NG/ML (ref 8–388)
FOLATE SERPL-MCNC: 15 NG/ML (ref 3.1–17.5)
GLOBULIN SER CALC-MCNC: 3.9 G/DL (ref 2–4)
GLUCOSE SERPL-MCNC: 71 MG/DL (ref 74–99)
HCT VFR BLD AUTO: 37.4 % (ref 36–48)
HGB BLD-MCNC: 11.5 G/DL (ref 12–16)
IRON SATN MFR SERPL: 30 % (ref 20–50)
IRON SERPL-MCNC: 90 UG/DL (ref 50–175)
LYMPHOCYTES # BLD: 2.1 K/UL (ref 1.1–5.9)
LYMPHOCYTES NFR BLD: 37 % (ref 14–44)
MCH RBC QN AUTO: 30.2 PG (ref 25–35)
MCHC RBC AUTO-ENTMCNC: 30.7 G/DL (ref 31–37)
MCV RBC AUTO: 98.2 FL (ref 78–102)
NEUTS SEG # BLD: 3.4 K/UL (ref 1.8–9.5)
NEUTS SEG NFR BLD: 60 % (ref 40–70)
PLATELET # BLD AUTO: 247 K/UL (ref 140–440)
POTASSIUM SERPL-SCNC: 3.2 MMOL/L (ref 3.5–5.5)
PROT SERPL-MCNC: 7.8 G/DL (ref 6.4–8.2)
RBC # BLD AUTO: 3.81 M/UL (ref 4.1–5.1)
SODIUM SERPL-SCNC: 142 MMOL/L (ref 136–145)
TIBC SERPL-MCNC: 301 UG/DL (ref 250–450)
VIT B12 SERPL-MCNC: 509 PG/ML (ref 211–911)
WBC # BLD AUTO: 5.7 K/UL (ref 4.5–13)

## 2022-02-14 PROCEDURE — 36415 COLL VENOUS BLD VENIPUNCTURE: CPT

## 2022-02-14 PROCEDURE — 85025 COMPLETE CBC W/AUTO DIFF WBC: CPT

## 2022-02-14 PROCEDURE — 82607 VITAMIN B-12: CPT

## 2022-02-14 PROCEDURE — 80053 COMPREHEN METABOLIC PANEL: CPT

## 2022-02-14 PROCEDURE — 82728 ASSAY OF FERRITIN: CPT

## 2022-02-14 PROCEDURE — 83540 ASSAY OF IRON: CPT

## 2022-02-14 NOTE — PROGRESS NOTES
[facility] Progress Note    Date: 2022    Name: Neo Holland    MRN: [id number]         : 1955    Peripheral Lab Draw    Recent Results (from the past 12 hour(s))   CBC WITH 3 PART DIFF    Collection Time: 22  8:32 AM   Result Value Ref Range    WBC 5.7 4.5 - 13.0 K/uL    RBC 3.81 (L) 4.10 - 5.10 M/uL    HGB 11.5 (L) 12.0 - 16.0 g/dL    HCT 37.4 36 - 48 %    MCV 98.2 78 - 102 FL    MCH 30.2 25.0 - 35.0 PG    MCHC 30.7 (L) 31 - 37 g/dL    RDW 12.5 11.5 - 14.5 %    PLATELET 102 873 - 255 K/uL    NEUTROPHILS 60 40 - 70 %    MIXED CELLS 3 0.1 - 17 %    LYMPHOCYTES 37 14 - 44 %    ABS. NEUTROPHILS 3.4 1.8 - 9.5 K/UL    ABS. MIXED CELLS 0.2 0.0 - 2.3 K/uL    ABS. LYMPHOCYTES 2.1 1.1 - 5.9 K/UL    DF AUTOMATED         Ms. Angela Mesa to [facility], ambulatory at [phone] accompanied by self. Pt was assessed and education was provided. Ms. Garcia's vitals were reviewed and patient was observed for 5 minutes prior to treatment. There were no vitals taken for this visit. Blood obtained peripherally from left arm x 1 attempt with butterfly needle and sent to lab for per written orders. No bleeding or hematoma noted at site. Gauze and coban applied. Ms. Angela Mesa tolerated the phlebotomy, and had no complaints. Patient armband removed and shredded. Ms. Angela Mesa was discharged from [place] [address] in stable condition at 4833.      Rina Gibbons Phlebotomist PCT  2022  8:47 AM

## 2022-02-18 ENCOUNTER — OFFICE VISIT (OUTPATIENT)
Age: 67
End: 2022-02-18
Payer: COMMERCIAL

## 2022-02-18 VITALS
OXYGEN SATURATION: 98 % | RESPIRATION RATE: 14 BRPM | WEIGHT: 132.8 LBS | HEART RATE: 80 BPM | BODY MASS INDEX: 25.07 KG/M2 | TEMPERATURE: 97.8 F | HEIGHT: 61 IN | DIASTOLIC BLOOD PRESSURE: 81 MMHG | SYSTOLIC BLOOD PRESSURE: 128 MMHG

## 2022-02-18 DIAGNOSIS — E53.8 B12 DEFICIENCY: ICD-10-CM

## 2022-02-18 DIAGNOSIS — C50.911 MALIGNANT NEOPLASM OF RIGHT FEMALE BREAST, UNSPECIFIED ESTROGEN RECEPTOR STATUS, UNSPECIFIED SITE OF BREAST (HCC): Primary | ICD-10-CM

## 2022-02-18 DIAGNOSIS — E55.9 VITAMIN D DEFICIENCY: ICD-10-CM

## 2022-02-18 DIAGNOSIS — D50.9 IRON DEFICIENCY ANEMIA, UNSPECIFIED IRON DEFICIENCY ANEMIA TYPE: ICD-10-CM

## 2022-02-18 PROCEDURE — 99213 OFFICE O/P EST LOW 20 MIN: CPT | Performed by: INTERNAL MEDICINE

## 2022-02-18 RX ORDER — ATORVASTATIN CALCIUM 80 MG/1
80 TABLET, FILM COATED ORAL DAILY
COMMUNITY
Start: 2022-02-07

## 2022-02-18 NOTE — PROGRESS NOTES
Hematology/Oncology  Progress Note    Name: Don Krabbe  Date: 2022  : 1955  Primary Care Provider: Jonatan Barboza MD    Ms. Geraldine Bailey is a 61y.o. year old female with Right breast Cancer ER positive and ELEANOR    MAMMOGRAM BENIGN    Current Therapy: Iron supplementation    Subjective:        Breast cancer, right breast:  On 2021 her mammogram showed no mammographic evidence of malignancy and annual screening mammography is recommended.    *Mammogram due in 2022. *Completed 10 years of Tamoxifen.     Iron deficiency anemia:   Labs on 2021 showed ferritin 154, transferrin saturation 18%, WBC 5.8, H&H 11.1/36, MCV 94, platelet 991. Normal BUN and creatinine. Normal B12 and folate.      Patient now only has mild iron deficiency. Continue oral iron but every other day with a sip of vitamin C containing juice. Follow-up with PCP to refer to GI for colonoscopy (had one 5 years) Recheck labs in 6 months. The past medical, surgical and social history has been reviewed and remains unchanged.    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)      Past Surgical History:   Procedure Laterality Date    HX BREAST LUMPECTOMY      right    HX  SECTION      HX GYN      AR BREAST SURGERY PROCEDURE UNLISTED       Social History     Socioeconomic History    Marital status:      Spouse name: Not on file    Number of children: Not on file    Years of education: Not on file    Highest education level: Not on file   Occupational History    Not on file   Tobacco Use    Smoking status: Never Smoker    Smokeless tobacco: Never Used   Vaping Use    Vaping Use: Never used   Substance and Sexual Activity    Alcohol use: No    Drug use: No    Sexual activity: Yes   Other Topics Concern    Not on file   Social History Narrative    ** Merged History Encounter **          Social Determinants of Health     Financial Resource Strain:     Difficulty of Paying Living Expenses: Not on file   Food Insecurity:     Worried About Running Out of Food in the Last Year: Not on file    Jimmy of Food in the Last Year: Not on file   Transportation Needs:     Lack of Transportation (Medical): Not on file    Lack of Transportation (Non-Medical): Not on file   Physical Activity:     Days of Exercise per Week: Not on file    Minutes of Exercise per Session: Not on file   Stress:     Feeling of Stress : Not on file   Social Connections:     Frequency of Communication with Friends and Family: Not on file    Frequency of Social Gatherings with Friends and Family: Not on file    Attends Jewish Services: Not on file    Active Member of Clubs or Organizations: Not on file    Attends Club or Organization Meetings: Not on file    Marital Status: Not on file   Intimate Partner Violence:     Fear of Current or Ex-Partner: Not on file    Emotionally Abused: Not on file    Physically Abused: Not on file    Sexually Abused: Not on file   Housing Stability:     Unable to Pay for Housing in the Last Year: Not on file    Number of [place] in the Last Year: Not on file    Unstable Housing in the Last Year: Not on file     Family History   Problem Relation Age of Onset    Breast Cancer Sister     Breast Cancer Other         aunt-nos    Heart Disease Father     Cancer Father     Hypertension Mother     Asthma Mother      Current Outpatient Medications   Medication Sig Dispense Refill    atorvastatin (LIPITOR) 80 mg tablet Take 80 mg by mouth daily.  potassium chloride (K-DUR, KLOR-CON) 20 mEq tablet take 1 tablet by mouth twice a day 10 Tablet 0    ferrous sulfate (SLOW FE) 142 mg (45 mg iron) ER tablet Take 1 Tab by mouth Daily (before breakfast).  60 Tab 2    tamoxifen (NOLVADEX) 20 mg tablet Take 1 Tab by mouth daily. (Patient taking differently: Take 20 mg by mouth daily. Not Taking) 30 Tab 11    aspirin 81 mg tablet Take 81 mg by mouth.  esomeprazole (NEXIUM) 20 mg capsule Take  by mouth daily.  tamoxifen (NOLVADEX) 20 mg tablet Take 20 mg by mouth two (2) times a day. Not Taking      ergocalciferol (VITAMIN D2) 50,000 unit capsule Take 50,000 Units by mouth every seven (7) days. Review of Systems:    General :The patient has no complaints and there is no physical distress evident. Psychological : patient denies having any psychological symptoms such as hallucinations depression or anxiety. Ophthalmic:the patient denies having any visual impairment or eye discomfort. ENT: there are no abnormalities reported. Allergy and Immunology:the patient denies having any seasonal allergies or allergies to medications other than those already outlined above. Hematological and Lymphatic: the patient denies having any bruising, bleeding or lymphadenopathy. Endocrine: the patient denies having any heat or cold intolerance. There is no history of diabetes or thyroid disorders. Breast: the patient denies having any history of breast mass or lumps. Respiratory:the patient denies having any cough, shortness of breath, or dyspnea on exertion. Cardiovascular: there are no complaints of chest pain, palpitations, chest pounding, or dyspnea on exertion. Gastrointestinal: the patient denies having nausea, emesis, diarrhea, constipation, or blood in the stool. Genito-Urinary: the patient denies having urinary urgency, frequency, or dysuria. Musculoskeletal: with the exception of mild arthralgias the patient has no other musculoskeletal complaints. Neurological:  denies having any numbness, tingling, or neurologic deficits.      Dermatological: patient denies having any unexplained rash, skin ulcerations, or hives.    Objective:     Visit Vitals  /81   Pulse 80   Temp 97.8 °F (36.6 °C)   Resp 14   Ht 5' 1\" (1.549 m)   Wt 60.2 kg (132 lb 12.8 oz)   SpO2 98%   BMI 25.09 kg/m²     Pain Score: 0    Physical Exam: Examination with chaperone Key Zhang RN    ECO    General: Well appearing, in NAD    Psychologic: mood and affect are appropriate, no anxiety or depression noted    Skin: examination of the skin reveals no bruising, rash or petechiae    HEENT: Normocephalic, atraumatic. Conjunctiva and sclera are clear. Pupils are equal, round and reactive to light. EOMs are intact. Neck: supple without lymphadenopathy, JVD or thyromegaly    Lymphatics: no palpable cervical, supraclavicular, infraclavicular, axillary or inguinal lymphadenopathy    Breast: Examination upright and supine both breasts. Both breasts have implants both breasts is normal nipple areolar complex normal skin no masses palpable. Lungs: clear breath sounds bilaterally, no rhonchi or wheezes noted    Heart: Regular rate and rhythm, S1-S2 noted. Abdomen: soft, non-tender, non-distended, no HSM, positive bowel sounds    Extremities: without clubbing, cyanosis or edema    Neurologic: no focal deficits, steady gait, Alert and oriented x 3. Laboratory Data:     Results for orders placed or performed during the hospital encounter of 22   CBC WITH 3 PART DIFF     Status: Abnormal   Result Value Ref Range Status    WBC 5.7 4.5 - 13.0 K/uL Final    RBC 3.81 (L) 4.10 - 5.10 M/uL Final    HGB 11.5 (L) 12.0 - 16.0 g/dL Final    HCT 37.4 36 - 48 % Final    MCV 98.2 78 - 102 FL Final    MCH 30.2 25.0 - 35.0 PG Final    MCHC 30.7 (L) 31 - 37 g/dL Final    RDW 12.5 11.5 - 14.5 % Final    PLATELET 903 799 - 186 K/uL Final    NEUTROPHILS 60 40 - 70 % Final    MIXED CELLS 3 0.1 - 17 % Final    LYMPHOCYTES 37 14 - 44 % Final    ABS. NEUTROPHILS 3.4 1.8 - 9.5 K/UL Final    ABS. MIXED CELLS 0.2 0.0 - 2.3 K/uL Final    ABS.  LYMPHOCYTES 2.1 1.1 - 5.9 K/UL Final     Comment: Test performed at [address] [place] [address] or Outpatient Infusion Center Location. Reviewed by Medical Director. 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 Malignant neoplasm of right female breast, unspecified estrogen receptor status, unspecified site of breast ([facility] [address] [facility]    2. Iron deficiency anemia, unspecified iron deficiency anemia type    3. B12 deficiency    4. Vitamin D deficiency        Plan:     1. Patient is doing well and is following up with her primary care physician and gynecologist.  They do not perform breast examinations. 2. Most recent mammogram ordered by primary care physician Dr. Jason Bonilla was benign. 3. Patient's labs including the iron studies good today. 4. Patient will continue current iron replacement. 5. Patient will return in 6 months for reevaluation including laboratory tests. 6. Patient is in agreement with this plan    Follow-up and Dispositions  ·   Return in about 6 months (around 8/18/2022) for Follow up with labs, Follow_up In Person.         Orders Placed This Encounter    CBC WITH AUTOMATED DIFF     Standing Status:   Future     Standing Expiration Date:   2/19/2023    FERRITIN     Standing Status:   Future     Standing Expiration Date:   2/19/2023    IRON PROFILE     Standing Status:   Future     Standing Expiration Date:   8/76/4539    METABOLIC PANEL, COMPREHENSIVE     Standing Status:   Future     Standing Expiration Date:   2/19/2023    VITAMIN B12 & FOLATE     Standing Status:   Future     Standing Expiration Date:   2/19/2023    VITAMIN D, 25 HYDROXY     Standing Status:   Future     Standing Expiration Date:   2/19/2023    atorvastatin (LIPITOR) 80 mg tablet     Sig: Take 80 mg by mouth daily.        Wilma Locke MD  2/18/2022

## 2022-03-19 PROBLEM — D50.9 IRON DEFICIENCY ANEMIA: Status: ACTIVE | Noted: 2021-02-10

## 2022-09-02 ENCOUNTER — HOSPITAL ENCOUNTER (OUTPATIENT)
Dept: INFUSION THERAPY | Age: 67
Discharge: HOME | End: 2022-09-02
Payer: MEDICARE

## 2022-09-02 VITALS — TEMPERATURE: 98 F

## 2022-09-02 DIAGNOSIS — D50.9 IRON DEFICIENCY ANEMIA, UNSPECIFIED IRON DEFICIENCY ANEMIA TYPE: ICD-10-CM

## 2022-09-02 DIAGNOSIS — C50.911 MALIGNANT NEOPLASM OF RIGHT FEMALE BREAST, UNSPECIFIED ESTROGEN RECEPTOR STATUS, UNSPECIFIED SITE OF BREAST (HCC): ICD-10-CM

## 2022-09-02 DIAGNOSIS — E55.9 VITAMIN D DEFICIENCY: ICD-10-CM

## 2022-09-02 DIAGNOSIS — E53.8 B12 DEFICIENCY: ICD-10-CM

## 2022-09-02 LAB
25(OH)D3 SERPL-MCNC: 33.5 NG/ML (ref 30–100)
ALBUMIN SERPL-MCNC: 3.8 G/DL (ref 3.4–5)
ALBUMIN/GLOB SERPL: 1.1 {RATIO} (ref 0.8–1.7)
ALP SERPL-CCNC: 94 U/L (ref 45–117)
ALT SERPL-CCNC: 19 U/L (ref 13–56)
ANION GAP SERPL CALC-SCNC: 6 MMOL/L (ref 3–18)
AST SERPL-CCNC: 13 U/L (ref 10–38)
BASO+EOS+MONOS # BLD AUTO: 0.3 K/UL (ref 0–2.3)
BASO+EOS+MONOS NFR BLD AUTO: 6 % (ref 0.1–17)
BILIRUB SERPL-MCNC: 0.4 MG/DL (ref 0.2–1)
BUN SERPL-MCNC: 23 MG/DL (ref 7–18)
BUN/CREAT SERPL: 23 (ref 12–20)
CALCIUM SERPL-MCNC: 9.4 MG/DL (ref 8.5–10.1)
CHLORIDE SERPL-SCNC: 106 MMOL/L (ref 100–111)
CO2 SERPL-SCNC: 30 MMOL/L (ref 21–32)
CREAT SERPL-MCNC: 1.01 MG/DL (ref 0.6–1.3)
DIFFERENTIAL METHOD BLD: ABNORMAL
ERYTHROCYTE [DISTWIDTH] IN BLOOD BY AUTOMATED COUNT: 11.8 % (ref 11.5–14.5)
FERRITIN SERPL-MCNC: 137 NG/ML (ref 8–388)
FOLATE SERPL-MCNC: 10.3 NG/ML (ref 3.1–17.5)
GLOBULIN SER CALC-MCNC: 3.5 G/DL (ref 2–4)
GLUCOSE SERPL-MCNC: 100 MG/DL (ref 74–99)
HCT VFR BLD AUTO: 35.4 % (ref 36–48)
HGB BLD-MCNC: 10.9 G/DL (ref 12–16)
IRON SATN MFR SERPL: 26 % (ref 20–50)
IRON SERPL-MCNC: 72 UG/DL (ref 50–175)
LYMPHOCYTES # BLD: 1.7 K/UL (ref 1.1–5.9)
LYMPHOCYTES NFR BLD: 31 % (ref 14–44)
MCH RBC QN AUTO: 29.5 PG (ref 25–35)
MCHC RBC AUTO-ENTMCNC: 30.8 G/DL (ref 31–37)
MCV RBC AUTO: 95.9 FL (ref 78–102)
NEUTS SEG # BLD: 3.6 K/UL (ref 1.8–9.5)
NEUTS SEG NFR BLD: 63 % (ref 40–70)
PLATELET # BLD AUTO: 209 K/UL (ref 140–440)
POTASSIUM SERPL-SCNC: 3.5 MMOL/L (ref 3.5–5.5)
PROT SERPL-MCNC: 7.3 G/DL (ref 6.4–8.2)
RBC # BLD AUTO: 3.69 M/UL (ref 4.1–5.1)
SODIUM SERPL-SCNC: 142 MMOL/L (ref 136–145)
TIBC SERPL-MCNC: 273 UG/DL (ref 250–450)
VIT B12 SERPL-MCNC: 465 PG/ML (ref 211–911)
WBC # BLD AUTO: 5.6 K/UL (ref 4.5–13)

## 2022-09-02 PROCEDURE — 82306 VITAMIN D 25 HYDROXY: CPT

## 2022-09-02 PROCEDURE — 80053 COMPREHEN METABOLIC PANEL: CPT

## 2022-09-02 PROCEDURE — 82607 VITAMIN B-12: CPT

## 2022-09-02 PROCEDURE — 85025 COMPLETE CBC W/AUTO DIFF WBC: CPT

## 2022-09-02 PROCEDURE — 83540 ASSAY OF IRON: CPT

## 2022-09-02 PROCEDURE — 82728 ASSAY OF FERRITIN: CPT

## 2022-09-02 PROCEDURE — 36415 COLL VENOUS BLD VENIPUNCTURE: CPT

## 2022-09-02 NOTE — PROGRESS NOTES
[facility] Progress Note    Date: 2022    Name: Kalpesh Ckoer    MRN: [id number]         : 1955    Peripheral Lab Draw      Ms. Garcia to [facility], ambulatory at [phone] accompanied by self. Pt was assessed and education was provided. Ms. Garcia's vitals were reviewed and patient was observed for 5 minutes prior to treatment. Visit Vitals  Temp 98 °F (36.7 °C)     Recent Results (from the past 12 hour(s))   CBC WITH 3 PART DIFF    Collection Time: 22  9:22 AM   Result Value Ref Range    WBC 5.6 4.5 - 13.0 K/uL    RBC 3.69 (L) 4.10 - 5.10 M/uL    HGB 10.9 (L) 12.0 - 16.0 g/dL    HCT 35.4 (L) 36 - 48 %    MCV 95.9 78 - 102 FL    MCH 29.5 25.0 - 35.0 PG    MCHC 30.8 (L) 31 - 37 g/dL    RDW 11.8 11.5 - 14.5 %    PLATELET 049 233 - 336 K/uL    NEUTROPHILS 63 40 - 70 %    Mixed cells 6 0.1 - 17 %    LYMPHOCYTES 31 14 - 44 %    ABS. NEUTROPHILS 3.6 1.8 - 9.5 K/UL    ABS. MIXED CELLS 0.3 0.0 - 2.3 K/uL    ABS. LYMPHOCYTES 1.7 1.1 - 5.9 K/UL    DF AUTOMATED         Blood obtained peripherally from left arm x 1 attempt with butterfly needle and sent to lab per written orders. No bleeding or hematoma noted at site. Gauze and coban applied. Ms. Nanda Candelaria tolerated the phlebotomy, and had no complaints. Patient armband removed and shredded. Ms. Nanda Candelaria was discharged from [place] [address] in stable condition at [id number].      Aquilino Line Phlebotomist PCT  2022  9:53 AM

## 2022-09-09 ENCOUNTER — VIRTUAL VISIT (OUTPATIENT)
Age: 67
End: 2022-09-09
Payer: MEDICARE

## 2022-09-09 DIAGNOSIS — D50.9 IRON DEFICIENCY ANEMIA, UNSPECIFIED IRON DEFICIENCY ANEMIA TYPE: Primary | ICD-10-CM

## 2022-09-09 PROCEDURE — 99442 PR PHYS/QHP TELEPHONE EVALUATION 11-20 MIN: CPT | Performed by: NURSE PRACTITIONER

## 2022-09-09 NOTE — PROGRESS NOTES
Hernesto Dolan is a 74 y.o. female, evaluated via audio-only technology on 2022 for follow up. NAME: Hernesto Dolan  : 1955  Date: 22    PCP: Whitley Curiel MD    Assessment & Plan:   Malignant Neoplasm Right Breast  --2022 Mammogram showed no evidence of malignancy and annual screening is recommended. --Completed 10 years of Tamoxifen Therapy    Iron Deficiency Anemia/ Anemoa of Chronic Disease  --2022 CBC showed WBC 5.6K/uL, hmeoglobin 10.9g/dL with hematocrit of 35.4%. Platelet 417. Iron Sat 26%. Ferritin 137. BUN 23 and Creatinine 1.01.   --Patient will continue her oral iron supplementation every other day before breakfast.   --Will continue to monitor. Vitamin D Deficiency  --2022 Vit D normal. Will defer to PCP    Vitamin B12 Deficiency  --2022 Vitamin B12 and Folate normal. Will defer to PCP. Subjective:   Ms. Hernesto Dolan is a 74 y.o. female who was evaluated via audio-only technology. She has history of Invasive Ductal Adenocarcinoma, Right Breast and completed 10 years of Tamoxifen therapy. She also has Iron Deficiency Anemia. She states she has been doing well since her last office visit. She denies any fevers, chills, recurrent infections, and skin rash. She denies any shortness of breath, dizziness, headaches, weakness, fatigue, and palpitations. She denies any abdominal pain, nausea, vomting, diarrhea, or constipation. She denies any pain or discomfort. She does not have any concerns or complaints to report at this time. Prior to Admission medications    Medication Sig Start Date End Date Taking? Authorizing Provider   atorvastatin (LIPITOR) 80 mg tablet Take 80 mg by mouth daily. 22   Provider, Historical   potassium chloride (K-DUR, KLOR-CON) 20 mEq tablet take 1 tablet by mouth twice a day 21   Jillian Landa DNP   ferrous sulfate (SLOW FE) 142 mg (45 mg iron) ER tablet Take 1 Tab by mouth Daily (before breakfast). 5/21/20   Alexy Villavicencio DNP   tamoxifen (NOLVADEX) 20 mg tablet Take 1 Tab by mouth daily. Patient taking differently: Take 20 mg by mouth daily. Not Taking 5/12/20   Alexy Villavicencio DNP   aspirin 81 mg tablet Take 81 mg by mouth. Provider, Historical   esomeprazole (NEXIUM) 20 mg capsule Take  by mouth daily. Provider, Historical   tamoxifen (NOLVADEX) 20 mg tablet Take 20 mg by mouth two (2) times a day. Not Taking    Provider, Historical   ergocalciferol (VITAMIN D2) 50,000 unit capsule Take 50,000 Units by mouth every seven (7) days. Provider, Historical       Review of Systems   Constitutional:  Negative for chills, fever, malaise/fatigue and weight loss. HENT:  Negative for congestion and sore throat. Respiratory:  Negative for shortness of breath. Cardiovascular:  Negative for chest pain and leg swelling. Gastrointestinal:  Negative for abdominal pain, blood in stool, constipation, diarrhea, melena, nausea and vomiting. Genitourinary:  Negative for hematuria. Musculoskeletal:  Negative for myalgias. Skin:  Negative for rash. Neurological:  Negative for dizziness, weakness and headaches. Endo/Heme/Allergies:  Does not bruise/bleed easily. Patient-Reported Weight: 132LBS     Denloki Rodriguez was evaluated through a patient-initiated, synchronous (real-time) audio only encounter. She (or guardian if applicable) is aware that it is a billable service, which includes applicable co-pays, with coverage as determined by her insurance carrier. This visit was conducted with the patient's (and/or Brooke Doty guardian's) verbal consent. She has not had a related appointment within my department in the past 7 days or scheduled within the next 24 hours. The patient was located in a state where the provider was licensed to provide care. The patient was located at: Home: [address]  The provider was located at:  Facility (Appt Department): [address] [facility] [address]  [address] [place] [id number]    Total Time: minutes: 20    Follow up in 6 months with repeat labs or sooner if indicated.     Orders Placed This Encounter    CBC WITH AUTOMATED DIFF     Standing Status:   Future     Standing Expiration Date:   9/10/2023    FERRITIN     Standing Status:   Future     Standing Expiration Date:   9/10/2023    IRON PROFILE     Standing Status:   Future     Standing Expiration Date:   5/19/9805    METABOLIC PANEL, COMPREHENSIVE     Standing Status:   Future     Standing Expiration Date:   9/10/2023         Harriet Chen DNP, FNP-C  09/09/22      CC: Dion Rivero MD

## 2023-02-03 DIAGNOSIS — D50.9 IRON DEFICIENCY ANEMIA, UNSPECIFIED IRON DEFICIENCY ANEMIA TYPE: Primary | ICD-10-CM

## 2023-02-05 DIAGNOSIS — D50.9 IRON DEFICIENCY ANEMIA, UNSPECIFIED IRON DEFICIENCY ANEMIA TYPE: Primary | ICD-10-CM

## 2023-02-06 DIAGNOSIS — D50.9 IRON DEFICIENCY ANEMIA, UNSPECIFIED IRON DEFICIENCY ANEMIA TYPE: Primary | ICD-10-CM

## 2023-03-08 ENCOUNTER — APPOINTMENT (OUTPATIENT)
Dept: INFUSION THERAPY | Age: 68
End: 2023-03-08

## 2023-03-09 DIAGNOSIS — D50.9 IRON DEFICIENCY ANEMIA, UNSPECIFIED IRON DEFICIENCY ANEMIA TYPE: ICD-10-CM
